# Patient Record
Sex: FEMALE | Race: WHITE | NOT HISPANIC OR LATINO | Employment: FULL TIME | URBAN - METROPOLITAN AREA
[De-identification: names, ages, dates, MRNs, and addresses within clinical notes are randomized per-mention and may not be internally consistent; named-entity substitution may affect disease eponyms.]

---

## 2024-11-18 ENCOUNTER — EVALUATION (OUTPATIENT)
Dept: PHYSICAL THERAPY | Facility: CLINIC | Age: 54
End: 2024-11-18
Payer: COMMERCIAL

## 2024-11-18 DIAGNOSIS — M17.11 UNILATERAL PRIMARY OSTEOARTHRITIS, RIGHT KNEE: Primary | ICD-10-CM

## 2024-11-18 PROCEDURE — 97110 THERAPEUTIC EXERCISES: CPT | Performed by: PHYSICAL THERAPIST

## 2024-11-18 PROCEDURE — 97161 PT EVAL LOW COMPLEX 20 MIN: CPT | Performed by: PHYSICAL THERAPIST

## 2024-11-18 NOTE — PROGRESS NOTES
PT Evaluation     Today's date: 2024  Patient name: Rayne Plasencia  : 1970  MRN: [id number]  Referring provider: Ata Stafford MD  Dx:   Encounter Diagnosis     ICD-10-CM    1. Unilateral primary osteoarthritis, right knee  M17.11           Start Time: 0800          Assessment  Impairments: abnormal or restricted ROM, impaired balance, impaired physical strength, lacks appropriate home exercise program, pain with function and poor posture     Assessment details: Rayne Plasencia is a 54 y.o. female presents with R knee OA, scheduled for R TKA on 24.  Referred to PT to learn pre-hab program. Rayne Plasencia has the above listed impairments and will benefit from skilled PT to improve deficits to return to prior level of function.  Rayne Plasencia was educated on eval findings and plan for management.  HEP initiated.  Rayne would benefit from skilled services to improve ROM, strength, flexibility, and function, and to decrease pain.    Understanding of Dx/Px/POC: good     Prognosis: good    Goals  3 visits: independent with HEP for self management    Plan  Patient would benefit from: skilled physical therapy  Planned modality interventions: cryotherapy    Planned therapy interventions: abdominal trunk stabilization, neuromuscular re-education, postural training, strengthening, stretching, therapeutic activities, therapeutic exercise, flexibility, home exercise program and patient/caregiver education    Frequency: 2x week  Duration in weeks: 2  Treatment plan discussed with: patient    Subjective Evaluation    History of Present Illness  Mechanism of injury: Pt reporting over 1 year h/o insidious onset R knee pain.  Notes coming back from work trip with prolonged walking.  Fell 2x over the past year, MRI revealed stress fx R tibia May 2024 following a fall.  Was placed in  brace x 6 wks.  Received multiple injections, no relief.  Another MRI taken, revealing MMT, also dx with OA.  Now scheduled  "for R TKA on 24,  Referred to PT for pre-hab.  Chief complaint R knee pain.  Functional limitations currently prolonged ambulation, sharp pain with sleep, R knee locks/gives out.  Patient Goals  Patient goals for therapy: decreased pain, increased strength, increased motion, independence with ADLs/IADLs, improved balance and return to sport/leisure activities  Patient goal: Learn HEP prior to R TKA 24.  Resume Peloton bike  Pain  Current pain ratin  At best pain ratin  At worst pain ratin  Location: Medial aspect R knee primarily  Quality: sharp  Aggravating factors: walking and stair climbing    Social Support  Steps to enter house: yes  2  Stairs in house: no   Lives in: one-story house  Lives with: spouse    Employment status: working (Work from home on computer)    Diagnostic Tests  X-ray: abnormal  MRI studies: abnormal  Treatments  Previous treatment: injection treatment  Current treatment: injection treatment      Objective    Gait: no AD, mildly antalgic RLE.    Observation: moderate edema R knee, no ecchymosis or erythema.  Mild tibial varum R > L.    OH Squat: weight shift to L, good balance, increased trunk flexion at hips    Knee A/PROM:  R: 1-0-130 deg  L: 3-0-142 deg    MMT: quads: R 4/5, L 5/5.  B hamstrings, glute medius 5/5.  Glute max: R 4-/5, L 4/5.  R quad set Fair    Special tests: B Lachman, Reverse Lachman, Ronald, varus and valgus stress (-).    Patellar mobility: WNL B    Flexibility: B hamstrings, gastroc WNL.  B hip flexors moderately tight.  B Jyoti's (-).         Precautions: PMH R tibia stress fx May 2024, depression, anxiety.  Pre-Hab 3 visits      Manuals                                                                 Neuro Re-Ed             QS 10\"x  15            TA cx nv                                                                             Ther Ex             Short sit flexion 10\"  x15            SLR 2x10            TKE w t band Blk 2x10      "       LAQ 4#  2x10            Nu Step for strength nv            U/L bridge R nv            Stand HF str B nv            Leg press R             Ther Activity                                                                              Modalities             Ice

## 2024-11-18 NOTE — LETTER
2024    Ata Stafford MD  [address] [facility] [address]    Patient: Rayne Plasencia   YOB: 1970   Date of Visit: 2024     Encounter Diagnosis     ICD-10-CM    1. Unilateral primary osteoarthritis, right knee  M17.11           Dear Dr. Stafford:    Thank you for your recent referral of Rayne Plasencia. Please review the attached evaluation summary from Rayne's recent visit.     Please verify that you agree with the plan of care by signing the attached order.     If you have any questions or concerns, please do not hesitate to call.     I sincerely appreciate the opportunity to share in the care of one of your patients and hope to have another opportunity to work with you in the near future.       Sincerely,    Rajiv Mary, PT      Referring Provider:      I certify that I have read the below Plan of Care and certify the need for these services furnished under this plan of treatment while under my care.                    Ata Stafford MD  [address] [facility] [address]  Via Fax: [phone]          PT Evaluation     Today's date: 2024  Patient name: Rayne Plasencia  : 1970  MRN: [id number]  Referring provider: Ata Stafford MD  Dx:   Encounter Diagnosis     ICD-10-CM    1. Unilateral primary osteoarthritis, right knee  M17.11           Start Time: 0800          Assessment  Impairments: abnormal or restricted ROM, impaired balance, impaired physical strength, lacks appropriate home exercise program, pain with function and poor posture     Assessment details: Rayne Plasencia is a 54 y.o. female presents with R knee OA, scheduled for R TKA on 24.  Referred to PT to learn pre-hab program. Rayne Plasencia has the above listed impairments and will benefit from skilled PT to improve deficits to return to prior level of function.  Rayne Plasencia was educated on eval findings and plan for management.  HEP initiated.  Rayne would benefit  from skilled services to improve ROM, strength, flexibility, and function, and to decrease pain.    Understanding of Dx/Px/POC: good     Prognosis: good    Goals  3 visits: independent with HEP for self management    Plan  Patient would benefit from: skilled physical therapy  Planned modality interventions: cryotherapy    Planned therapy interventions: abdominal trunk stabilization, neuromuscular re-education, postural training, strengthening, stretching, therapeutic activities, therapeutic exercise, flexibility, home exercise program and patient/caregiver education    Frequency: 2x week  Duration in weeks: 2  Treatment plan discussed with: patient    Subjective Evaluation    History of Present Illness  Mechanism of injury: Pt reporting over 1 year h/o insidious onset R knee pain.  Notes coming back from work trip with prolonged walking.  Fell 2x over the past year, MRI revealed stress fx R tibia May 2024 following a fall.  Was placed in  brace x 6 wks.  Received multiple injections, no relief.  Another MRI taken, revealing MMT, also dx with OA.  Now scheduled for R TKA on 24,  Referred to PT for pre-hab.  Chief complaint R knee pain.  Functional limitations currently prolonged ambulation, sharp pain with sleep, R knee locks/gives out.  Patient Goals  Patient goals for therapy: decreased pain, increased strength, increased motion, independence with ADLs/IADLs, improved balance and return to sport/leisure activities  Patient goal: Learn HEP prior to R TKA 24.  Resume Peloton bike  Pain  Current pain ratin  At best pain ratin  At worst pain ratin  Location: Medial aspect R knee primarily  Quality: sharp  Aggravating factors: walking and stair climbing    Social Support  Steps to enter house: yes  2  Stairs in house: no   Lives in: one-story house  Lives with: spouse    Employment status: working (Work from home on computer)    Diagnostic Tests  X-ray: abnormal  MRI studies:  "abnormal  Treatments  Previous treatment: injection treatment  Current treatment: injection treatment      Objective    Gait: no AD, mildly antalgic RLE.    Observation: moderate edema R knee, no ecchymosis or erythema.  Mild tibial varum R > L.    OH Squat: weight shift to L, good balance, increased trunk flexion at hips    Knee A/PROM:  R: 1-0-130 deg  L: 3-0-142 deg    MMT: quads: R 4/5, L 5/5.  B hamstrings, glute medius 5/5.  Glute max: R 4-/5, L 4/5.  R quad set Fair    Special tests: B Lachman, Reverse Lachman, Ronald, varus and valgus stress (-).    Patellar mobility: WNL B    Flexibility: B hamstrings, gastroc WNL.  B hip flexors moderately tight.  B Jyoti's (-).         Precautions: PMH R tibia stress fx May 2024, depression, anxiety.  Pre-Hab 3 visits      Manuals 11/18 /24                                                                Neuro Re-Ed             QS 10\"x  15            TA cx nv                                                                             Ther Ex             Short sit flexion 10\"  x15            SLR 2x10            TKE w t band Blk 2x10            LAQ 4#  2x10            Nu Step for strength nv            U/L bridge R nv            Stand HF str B nv            Leg press R             Ther Activity                                                                              Modalities             Ice                                                "

## 2024-11-20 ENCOUNTER — OFFICE VISIT (OUTPATIENT)
Dept: PHYSICAL THERAPY | Facility: CLINIC | Age: 54
End: 2024-11-20
Payer: COMMERCIAL

## 2024-11-20 DIAGNOSIS — M17.11 UNILATERAL PRIMARY OSTEOARTHRITIS, RIGHT KNEE: Primary | ICD-10-CM

## 2024-11-20 PROCEDURE — 97112 NEUROMUSCULAR REEDUCATION: CPT

## 2024-11-20 PROCEDURE — 97110 THERAPEUTIC EXERCISES: CPT

## 2024-11-20 NOTE — PROGRESS NOTES
"Daily Note     Today's date: 2024  Patient name: Rayne Plasencia  : 1970  MRN: [id number]  Referring provider: Ata Stafford MD  Dx:   Encounter Diagnosis     ICD-10-CM    1. Unilateral primary osteoarthritis, right knee  M17.11                      Subjective: \"It's just really stiff, more than normal, but I've been doing the exercises, I hope that means it's working.\" Pt denies R knee pain, just stiffness at arrival.       Objective: See treatment diary below      Assessment: Tolerated treatment well. Patient would benefit from continued PT For improved strength, flexibility and overall function.  Cueing for proper amount of reps/hold times with exercises.  Issued updated HEP.     Plan: Continue per plan of care.      Precautions: PMH R tibia stress fx May 2024, depression, anxiety.  Pre-Hab 3 visits      Manuals                                                                Neuro Re-Ed             QS 10\"x  15 x20           TA cx nv 10''x10                                                                            Ther Ex             Short sit flexion 10\"  x15 x20           SLR 2x10 3x10           TKE w t band Blk 2x10 3x10           LAQ 4#  2x10 3x10            Nu Step for strength nv 5' L1           U/L bridge R nv 10''x10           Stand HF str B nv 20''x5            Leg press R             Ther Activity                                                                              Modalities             Ice                               "

## 2024-11-25 ENCOUNTER — OFFICE VISIT (OUTPATIENT)
Dept: PHYSICAL THERAPY | Facility: CLINIC | Age: 54
End: 2024-11-25
Payer: COMMERCIAL

## 2024-11-25 DIAGNOSIS — M17.11 UNILATERAL PRIMARY OSTEOARTHRITIS, RIGHT KNEE: Primary | ICD-10-CM

## 2024-11-25 PROCEDURE — 97112 NEUROMUSCULAR REEDUCATION: CPT

## 2024-11-25 PROCEDURE — 97110 THERAPEUTIC EXERCISES: CPT

## 2024-11-25 NOTE — PROGRESS NOTES
"Daily Note     Today's date: 2024  Patient name: Rayne Plasencia  : 1970  MRN: [id number]  Referring provider: Ata Stafford MD  Dx:   Encounter Diagnosis     ICD-10-CM    1. Unilateral primary osteoarthritis, right knee  M17.11                      Subjective: \"Better than how it felt from the workout you gave me last time.\" Pt reports DOMS R knee following last visit, denies R knee pain 0/10 at arrival.  Pt states she was supposed to get a home cycle bike post-op but her insurance won't pay for it.    Objective: See treatment diary below  Unable to attain appropriate    Assessment: Tolerated treatment well. Patient would benefit from continued PT For improved strength, flexibility and overall function.  Added wall slides F to assist with greater ROM both pre and post op.  Issued updated HEP and encouraged icing to address DOMS.   Today is patient's last pre-op visit, pt to D/C to HEP with surgery scheduled for 24.     Plan: D/C to HEP. Surgery scheduled 24.      Precautions: PMH R tibia stress fx May 2024, depression, anxiety.  Pre-Hab 3 visits      Manuals                                                               Neuro Re-Ed             QS 10\"x  15 x20 x30          TA cx nv 10''x10 HEP                                                                           Ther Ex             Short sit flexion 10\"  x15 x20 x30          SLR 2x10 3x10 3x10          TKE w t band Blk 2x10 3x10 3x10          LAQ 4#  2x10 3x10  3x10          Nu Step for strength nv 5' L1 7'          U/L bridge R nv 10''x10 2x10          Stand HF str B nv 20''x5  No stretch, R knee p!          Leg press R seat @ 5   3pl 2x10          Wall slide F   10''x10          Ther Activity                                                                              Modalities             Ice                                 "

## 2024-12-20 ENCOUNTER — EVALUATION (OUTPATIENT)
Dept: PHYSICAL THERAPY | Facility: CLINIC | Age: 54
End: 2024-12-20
Payer: COMMERCIAL

## 2024-12-20 DIAGNOSIS — M17.11 UNILATERAL PRIMARY OSTEOARTHRITIS, RIGHT KNEE: Primary | ICD-10-CM

## 2024-12-20 DIAGNOSIS — Z96.651 S/P TKR (TOTAL KNEE REPLACEMENT), RIGHT: ICD-10-CM

## 2024-12-20 PROCEDURE — 97110 THERAPEUTIC EXERCISES: CPT | Performed by: PHYSICAL THERAPIST

## 2024-12-20 PROCEDURE — 97161 PT EVAL LOW COMPLEX 20 MIN: CPT | Performed by: PHYSICAL THERAPIST

## 2024-12-20 NOTE — LETTER
2024    Bambi Mclain PA-C  [address] [facility] [address]    Patient: Rayne Plasencia   YOB: 1970   Date of Visit: 2024     Encounter Diagnosis     ICD-10-CM    1. Unilateral primary osteoarthritis, right knee  M17.11       2. S/P TKR (total knee replacement), right  Z96.651           Dear Dr. Mclain:    Thank you for your recent referral of Rayne Plasencia. Please review the attached evaluation summary from Rayne's recent visit.     Please verify that you agree with the plan of care by signing the attached order.     If you have any questions or concerns, please do not hesitate to call.     I sincerely appreciate the opportunity to share in the care of one of your patients and hope to have another opportunity to work with you in the near future.       Sincerely,    Rajiv Mary, PT      Referring Provider:      I certify that I have read the below Plan of Care and certify the need for these services furnished under this plan of treatment while under my care.                    Bambi Mclain PA-C  [address] [facility] [address]  Via Fax: [phone]          PT Evaluation     Today's date: 2024  Patient name: Rayne Plasencia  : 1970  MRN: [id number]  Referring provider: Bambi Mclain PA-C  Dx:   Encounter Diagnosis     ICD-10-CM    1. Unilateral primary osteoarthritis, right knee  M17.11       2. S/P TKR (total knee replacement), right  Z96.651                      Assessment  Impairments: abnormal muscle firing, abnormal or restricted ROM, activity intolerance, impaired balance, impaired physical strength, lacks appropriate home exercise program, pain with function, weight-bearing intolerance and unable to perform ADL  Symptom irritability: high    Assessment details: Rayne Plasencia is a 54 y.o. female s/p R TKA on 24. Rayne Plasencia has the above listed impairments and will benefit from skilled PT to improve deficits to return to prior  level of function.  Rayne Plasencia was educated on eval findings and plan for management, safe ice, ankle pumps, need to flex R knee during sit/stand transfers.  HEP initiated.  Rayne would benefit from skilled services to improve ROM, strength, flexibility, and function, and to decrease pain.    Understanding of Dx/Px/POC: good     Prognosis: good    Goals  Impairment Goals  - Pt I with initial HEP in 1-2 visits  - R quad set Good in 2 wks  - Improve ROM equal to WFL in 4-6 weeks  - Increase strength to 5/5 in all affected areas in 4-6 weeks    Functional Goals  - Increase Functional Status Measure to: 52 in 4-6  weeks (score 11 at eval)  - Patient will be independent with comprehensive HEP in 4-6 weeks  - Ambulation is improved to prior level of function in 4-6 weeks  - Stair climbing is improved to prior level of function in 4-6 weeks  - RLE dynamic strength and balance at least 90% of LLE via single leg step test, in 4-6 wks.      Plan  Patient would benefit from: skilled physical therapy  Planned modality interventions: cryotherapy and electrical stimulation/Russian stimulation    Planned therapy interventions: joint mobilization, manual therapy, balance, flexibility, neuromuscular re-education, patient/caregiver education, strengthening, stretching, home exercise program, therapeutic activities and therapeutic exercise    Frequency: 3x week  Duration in weeks: 4  Treatment plan discussed with: patient      Subjective Evaluation    History of Present Illness  Date of surgery: 12/17/2024  Mechanism of injury: surgery  Mechanism of injury: Pt reporting over 1 year h/o insidious onset R knee pain.  Notes coming back from work trip with prolonged walking.  Fell 2x over the past year, MRI revealed stress fx R tibia May 2024 following a fall.  Was placed in  brace x 6 wks.  Received multiple injections, no relief.  Another MRI taken, revealing MMT, also dx with OA.  Received 3 pre-op PT visits here.  Now s/p  "R TKA on 24.  Chief complaint R knee stiffness, \"sharp pains\".  Functional limitations: ambulation with wheeled walker, not driving.  Notes pain has improved since pt first returned home from hospital.  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    Transfers sit to/from stand with pt keeping R knee extended, (I).  Sit to/from supine with LLE assist for RLE, (I).    Observation: B knee high TEDS.  R TKA incision glued with steri-strips, no drainage or sign of infection.  R knee with mild erythema, moderate edema, no ecchymosis noted.      Knee A/PROM:  R: AROM only due to pain: 0-2-40 deg  L: 3-0-132 deg    MMT, Special tests: deferred due to pt post-op.  Quad set R Poor.    Patellar mobility: R with decreased and painful superior/inferior glide, L WNL    Palpation: R calf mildly TTP, \"sore\", L calf non-TTP.  B lower legs intact to light touch sensation.    Flexibility: B calves moderately tight.  Hamstrings: R moderately tight, L WNL.         Precautions: PMH R tibia stress fx May 2024, depression, anxiety.  TRIM SUTURES 24.      Manuals             Patellar glides sup/inf                                                    Neuro Re-Ed           " "  QS 10\"  x15            Ankle pumps HEP                                                                             Ther Ex             Heel slide w strap 10\"  x15            Short sit flexion nv            Wall slide flexion nv            Strap gastroc str 20\"  x5            SLR nv            SAQ             LAQ             Straddle HS str             Bike for ROM             TKE w t band             Leg Press R             Ther Activity             T ball squat             FSU             Gait Training                                       Modalities             Ice              Russian stim for QS PRN                                "

## 2024-12-20 NOTE — PROGRESS NOTES
PT Evaluation     Today's date: 2024  Patient name: Rayne Plasencia  : 1970  MRN: [id number]  Referring provider: Bambi Mclain PA-C  Dx:   Encounter Diagnosis     ICD-10-CM    1. Unilateral primary osteoarthritis, right knee  M17.11       2. S/P TKR (total knee replacement), right  Z96.651                      Assessment  Impairments: abnormal muscle firing, abnormal or restricted ROM, activity intolerance, impaired balance, impaired physical strength, lacks appropriate home exercise program, pain with function, weight-bearing intolerance and unable to perform ADL  Symptom irritability: high    Assessment details: Rayne Plasencia is a 54 y.o. female s/p R TKA on 24. Rayne Plasencia has the above listed impairments and will benefit from skilled PT to improve deficits to return to prior level of function.  Rayne Plasencia was educated on eval findings and plan for management, safe ice, ankle pumps, need to flex R knee during sit/stand transfers.  HEP initiated.  Rayne would benefit from skilled services to improve ROM, strength, flexibility, and function, and to decrease pain.    Understanding of Dx/Px/POC: good     Prognosis: good    Goals  Impairment Goals  - Pt I with initial HEP in 1-2 visits  - R quad set Good in 2 wks  - Improve ROM equal to WFL in 4-6 weeks  - Increase strength to 5/5 in all affected areas in 4-6 weeks    Functional Goals  - Increase Functional Status Measure to: 52 in 4-6  weeks (score 11 at eval)  - Patient will be independent with comprehensive HEP in 4-6 weeks  - Ambulation is improved to prior level of function in 4-6 weeks  - Stair climbing is improved to prior level of function in 4-6 weeks  - RLE dynamic strength and balance at least 90% of LLE via single leg step test, in 4-6 wks.      Plan  Patient would benefit from: skilled physical therapy  Planned modality interventions: cryotherapy and electrical stimulation/Russian stimulation    Planned therapy interventions:  "joint mobilization, manual therapy, balance, flexibility, neuromuscular re-education, patient/caregiver education, strengthening, stretching, home exercise program, therapeutic activities and therapeutic exercise    Frequency: 3x week  Duration in weeks: 4  Treatment plan discussed with: patient      Subjective Evaluation    History of Present Illness  Date of surgery: 2024  Mechanism of injury: surgery  Mechanism of injury: Pt reporting over 1 year h/o insidious onset R knee pain.  Notes coming back from work trip with prolonged walking.  Fell 2x over the past year, MRI revealed stress fx R tibia May 2024 following a fall.  Was placed in  brace x 6 wks.  Received multiple injections, no relief.  Another MRI taken, revealing MMT, also dx with OA.  Received 3 pre-op PT visits here.  Now s/p R TKA on 24.  Chief complaint R knee stiffness, \"sharp pains\".  Functional limitations: ambulation with wheeled walker, not driving.  Notes pain has improved since pt first returned home from hospital.  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    Transfers sit to/from stand with pt keeping R knee extended, (I).  Sit to/from supine with LLE assist for RLE, " "(I).    Observation: B knee high TEDS.  R TKA incision glued with steri-strips, no drainage or sign of infection.  R knee with mild erythema, moderate edema, no ecchymosis noted.      Knee A/PROM:  R: AROM only due to pain: 0-2-40 deg  L: 3-0-132 deg    MMT, Special tests: deferred due to pt post-op.  Quad set R Poor.    Patellar mobility: R with decreased and painful superior/inferior glide, L WNL    Palpation: R calf mildly TTP, \"sore\", L calf non-TTP.  B lower legs intact to light touch sensation.    Flexibility: B calves moderately tight.  Hamstrings: R moderately tight, L WNL.         Precautions: PMH R tibia stress fx May 2024, depression, anxiety.  TRIM SUTURES 12/27/24.      Manuals 12/20 /24            Patellar glides sup/inf                                                    Neuro Re-Ed             QS 10\"  x15            Ankle pumps HEP                                                                             Ther Ex             Heel slide w strap 10\"  x15            Short sit flexion nv            Wall slide flexion nv            Strap gastroc str 20\"  x5            SLR nv            SAQ             LAQ             Straddle HS str             Bike for ROM             TKE w t band             Leg Press R             Ther Activity             T ball squat             FSU             Gait Training                                       Modalities             Ice              Russian stim for QS PRN                  "

## 2024-12-23 ENCOUNTER — OFFICE VISIT (OUTPATIENT)
Dept: PHYSICAL THERAPY | Facility: CLINIC | Age: 54
End: 2024-12-23
Payer: COMMERCIAL

## 2024-12-23 DIAGNOSIS — M17.11 UNILATERAL PRIMARY OSTEOARTHRITIS, RIGHT KNEE: Primary | ICD-10-CM

## 2024-12-23 DIAGNOSIS — Z96.651 S/P TKR (TOTAL KNEE REPLACEMENT), RIGHT: ICD-10-CM

## 2024-12-23 PROCEDURE — 97110 THERAPEUTIC EXERCISES: CPT | Performed by: PHYSICAL THERAPIST

## 2024-12-23 PROCEDURE — 97112 NEUROMUSCULAR REEDUCATION: CPT | Performed by: PHYSICAL THERAPIST

## 2024-12-23 NOTE — PROGRESS NOTES
"Daily Note     Today's date: 2024  Patient name: Rayne Plasencia  : 1970  MRN: [id number]  Referring provider: Bambi Mclain PA-C  Dx:   Encounter Diagnosis     ICD-10-CM    1. Unilateral primary osteoarthritis, right knee  M17.11       2. S/P TKR (total knee replacement), right  Z96.651           Start Time: 1451          Subjective: R knee pain 3/10, notes pain improving.  Has not taken pain meds since this morning.  Pain improves with doing heel slides.      Objective: See treatment diary below.  PT assist required for SLR.      Assessment: Tolerated treatment fair. Patient would benefit from continued PT.  HEP progressed.      Plan: Continue per plan of care.      Precautions: PMH R tibia stress fx May 2024, depression, anxiety.  TRIM SUTURES 24.      Manuals            Patellar glides sup/inf  G3                                                  Neuro Re-Ed             QS 10\"  x15 x30           Ankle pumps HEP                                                                             Ther Ex             Heel slide w strap 10\"  x15            Short sit flexion nv 10\"x  15           Wall slide flexion nv 10\"  x15           Strap gastroc str 20\"  x5 x5           SLR nv 2x10 (A)           SAQ             LAQ             Straddle HS str             Bike for ROM  Part rev 10'           TKE w t band             Leg Press R             Ther Activity             T ball squat             FSU             Gait Training                                       Modalities             Ice              Russian stim for QS PRN                  "

## 2024-12-24 ENCOUNTER — OFFICE VISIT (OUTPATIENT)
Dept: PHYSICAL THERAPY | Facility: CLINIC | Age: 54
End: 2024-12-24
Payer: COMMERCIAL

## 2024-12-24 DIAGNOSIS — M17.11 UNILATERAL PRIMARY OSTEOARTHRITIS, RIGHT KNEE: Primary | ICD-10-CM

## 2024-12-24 DIAGNOSIS — Z96.651 S/P TKR (TOTAL KNEE REPLACEMENT), RIGHT: ICD-10-CM

## 2024-12-24 PROCEDURE — 97112 NEUROMUSCULAR REEDUCATION: CPT | Performed by: PHYSICAL THERAPIST

## 2024-12-24 PROCEDURE — 97110 THERAPEUTIC EXERCISES: CPT | Performed by: PHYSICAL THERAPIST

## 2024-12-24 NOTE — PROGRESS NOTES
"Daily Note     Today's date: 2024  Patient name: Rayne Plasencia  : 1970  MRN: [id number]  Referring provider: Bambi Mclain PA-C  Dx:   Encounter Diagnosis     ICD-10-CM    1. Unilateral primary osteoarthritis, right knee  M17.11       2. S/P TKR (total knee replacement), right  Z96.651                      Subjective: R knee pain 1-2/10, \"more of a nuisance pain.\"      Objective: See treatment diary below      Assessment: Tolerated treatment well. Patient exhibited good technique with therapeutic exercises and would benefit from continued PT      Plan: Continue per plan of care.      Precautions: PMH R tibia stress fx May 2024, depression, anxiety.  TRIM SUTURES 24.      Manuals           Patellar glides sup/inf  G3 G3                                                 Neuro Re-Ed             QS 10\"  x15 x30 x30          Ankle pumps HEP                                                                             Ther Ex             Heel slide w strap 10\"  x15            Short sit flexion nv 10\"x  15           Wall slide flexion nv 10\"  x15 x30          Strap gastroc str 20\"  x5 x5 x5          SLR nv 2x10 (A) 3x10 (A)          SAQ   2x10 (A)          LAQ             Straddle HS str             Bike for ROM  Part rev 10' 10'          TKE w t band   Blk 3x10          Leg Press R             Ther Activity             T ball squat   Mini 3x10          FSU             Gait Training                                       Modalities             Ice              Russian stim for QS PRN                    "

## 2024-12-26 ENCOUNTER — OFFICE VISIT (OUTPATIENT)
Dept: PHYSICAL THERAPY | Facility: CLINIC | Age: 54
End: 2024-12-26
Payer: COMMERCIAL

## 2024-12-26 DIAGNOSIS — Z96.651 S/P TKR (TOTAL KNEE REPLACEMENT), RIGHT: ICD-10-CM

## 2024-12-26 DIAGNOSIS — M17.11 UNILATERAL PRIMARY OSTEOARTHRITIS, RIGHT KNEE: Primary | ICD-10-CM

## 2024-12-26 PROCEDURE — 97112 NEUROMUSCULAR REEDUCATION: CPT | Performed by: PHYSICAL THERAPIST

## 2024-12-26 PROCEDURE — 97110 THERAPEUTIC EXERCISES: CPT | Performed by: PHYSICAL THERAPIST

## 2024-12-26 NOTE — PROGRESS NOTES
"Daily Note     Today's date: 2024  Patient name: Rayne Plasencia  : 1970  MRN: [id number]  Referring provider: Bambi Mclain PA-C  Dx:   Encounter Diagnosis     ICD-10-CM    1. Unilateral primary osteoarthritis, right knee  M17.11       2. S/P TKR (total knee replacement), right  Z96.651                      Subjective: R knee pain 1/10.  Doing HEP QD including flexion ROM.      Objective: See treatment diary below.  Aggressive HEP stressed to pt including ROM BID.  Flexion PROM 70 deg.      Assessment: Tolerated treatment fair. Patient would benefit from continued PT. Slow progress.  Aggressive HEP stressed, for flexion ROM in particular.  Unable to do independent SLR.      Plan: Continue per plan of care.      Precautions: PMH R tibia stress fx May 2024, depression, anxiety.  TRIM SUTURES 24.      Manuals          Patellar glides sup/inf  G3 G3 G3                                                Neuro Re-Ed             QS 10\"  x15 x30 x30 x30         Ankle pumps HEP                                                                             Ther Ex             Heel slide w strap 10\"  x15            Short sit flexion nv 10\"x  15           Wall slide flexion nv 10\"  x15 x30 x30         Strap gastroc str 20\"  x5 x5 x5 x5         SLR nv 2x10 (A) 3x10 (A) 3x10 (A)         SAQ   2x10 (A) 3x10 (I)         LAQ    2x10 (I)         Straddle HS str             Bike for ROM  Part rev 10' 10' 10'         TKE w t band   Blk 3x10 3x10         Leg Press R seat 6 holes    2 pl 3x10         Ther Activity             T ball squat   Mini 3x10 3x10 wall          FSU             Gait Training                                       Modalities             Ice              Russian stim for QS PRN                      "

## 2024-12-30 ENCOUNTER — OFFICE VISIT (OUTPATIENT)
Dept: PHYSICAL THERAPY | Facility: CLINIC | Age: 54
End: 2024-12-30
Payer: COMMERCIAL

## 2024-12-30 DIAGNOSIS — M17.11 UNILATERAL PRIMARY OSTEOARTHRITIS, RIGHT KNEE: Primary | ICD-10-CM

## 2024-12-30 DIAGNOSIS — Z96.651 S/P TKR (TOTAL KNEE REPLACEMENT), RIGHT: ICD-10-CM

## 2024-12-30 PROCEDURE — 97112 NEUROMUSCULAR REEDUCATION: CPT | Performed by: PHYSICAL THERAPIST

## 2024-12-30 PROCEDURE — 97110 THERAPEUTIC EXERCISES: CPT | Performed by: PHYSICAL THERAPIST

## 2024-12-30 NOTE — PROGRESS NOTES
"Daily Note     Today's date: 2024  Patient name: Rayne Plasencia  : 1970  MRN: [id number]  Referring provider: Bambi Mclain PA-C  Dx:   Encounter Diagnosis     ICD-10-CM    1. Unilateral primary osteoarthritis, right knee  M17.11       2. S/P TKR (total knee replacement), right  Z96.651                      Subjective: R knee pain 0-1/10, reports aggressive HEP over the weekend, \"I kept saying 'NO MANIPULATION!'\".  Pt reports that last week her  helped her with trunk transfer, holding her around the waist, \"we both felt it, like a pop\" R lower rib cage anteriorly, \"so I've been dealing with that.\"      Objective: See treatment diary below. Urged pt to heat/ice R lower flank PRN for pain.  To get checked out by physician PRN if pain does not improve.  Pt to PT with wheeled walker for ambulation, attempting with no AD.  PT showed pt gait with SPC L hand, 3 point reciprocal pattern.  Pt to obtain SPC.  Flexion PROM with wall slide and seated flexion 90 deg (70 deg last visit ).    PT intended to trim light, transparent proximal and distal sutures per PT referral.  Sutures not visible, likely already broken off at skin level.      Assessment: Tolerated treatment well. Patient would benefit from continued PT.  ROM slowly improving.  Still unable to do independent SLR.  Functional mobility improving, pt weaning to SPC in PT.      Plan: Continue per plan of care.      Precautions: PMH R tibia stress fx May 2024, depression, anxiety.        Manuals         Patellar glides sup/inf  G3 G3 G3 G3                                               Neuro Re-Ed             QS 10\"  x15 x30 x30 x30 x30        Ankle pumps HEP                                                                             Ther Ex             Heel slide w strap 10\"  x15            Short sit flexion nv 10\"x  15   x30        Wall slide flexion nv 10\"  x15 x30 x30 x30        Strap gastroc str 20\"  x5 x5 " x5 x5 x5        SLR nv 2x10 (A) 3x10 (A) 3x10 (A) 3x10  (A)        SAQ   2x10 (A) 3x10 (I) 3x10 (I)        LAQ    2x10 (I) 3x10 (I)        Straddle HS str             Bike for ROM  Part rev 10' 10' 10' 10'        TKE w t band   Blk 3x10 3x10 3x10        Leg Press R seat 6 holes    2 pl 3x10 3x10        Ther Activity             T ball squat   Mini 3x10 3x10 wall  3x10        FSU             Gait Training     SPC L                                  Modalities             Ice              Russian stim for QS PRN                         Composite Graft Text: The defect edges were debeveled with a #15 scalpel blade.  Given the location of the defect, shape of the defect, the proximity to free margins and the fact the defect was full thickness a composite graft was deemed most appropriate.  The defect was outline and then transferred to the donor site.  A full thickness graft was then excised from the donor site. The graft was then placed in the primary defect, oriented appropriately and then sutured into place.  The secondary defect was then repaired using a primary closure.

## 2024-12-31 ENCOUNTER — OFFICE VISIT (OUTPATIENT)
Dept: PHYSICAL THERAPY | Facility: CLINIC | Age: 54
End: 2024-12-31
Payer: COMMERCIAL

## 2024-12-31 DIAGNOSIS — M17.11 UNILATERAL PRIMARY OSTEOARTHRITIS, RIGHT KNEE: Primary | ICD-10-CM

## 2024-12-31 DIAGNOSIS — Z96.651 S/P TKR (TOTAL KNEE REPLACEMENT), RIGHT: ICD-10-CM

## 2024-12-31 PROCEDURE — 97110 THERAPEUTIC EXERCISES: CPT | Performed by: PHYSICAL THERAPIST

## 2024-12-31 PROCEDURE — 97530 THERAPEUTIC ACTIVITIES: CPT | Performed by: PHYSICAL THERAPIST

## 2024-12-31 NOTE — PROGRESS NOTES
"Daily Note     Today's date: 2024  Patient name: Rayne Plasencia  : 1970  MRN: [id number]  Referring provider: Bambi Mclain PA-C  Dx:   Encounter Diagnosis     ICD-10-CM    1. Unilateral primary osteoarthritis, right knee  M17.11       2. S/P TKR (total knee replacement), right  Z96.651           Start Time: 0945          Subjective: R knee pain 1-2/10, \"sore from working it with the exercises.\"      Objective: See treatment diary below.  Able to do (I) SLR by end of bout.      Assessment: Tolerated treatment well. Patient exhibited good technique with therapeutic exercises and would benefit from continued PT.  Slowly making progress.      Plan: Continue per plan of care.      Precautions: PMH R tibia stress fx May 2024, depression, anxiety.        Manuals        Patellar glides sup/inf  G3 G3 G3 G3                                               Neuro Re-Ed             QS 10\"  x15 x30 x30 x30 x30 X30 ankle roll       Ankle pumps HEP                                                                             Ther Ex             Heel slide w strap 10\"  x15            Short sit flexion nv 10\"x  15   x30 x30       Wall slide flexion nv 10\"  x15 x30 x30 x30 x30       Strap gastroc str 20\"  x5 x5 x5 x5 x5 x5       SLR nv 2x10 (A) 3x10 (A) 3x10 (A) 3x10  (A) 3x10 (A) to (I)       SAQ   2x10 (A) 3x10 (I) 3x10 (I) 3x10       LAQ    2x10 (I) 3x10 (I) 3x10       Straddle HS str             Bike for ROM  Part rev 10' 10' 10' 10' 10'       TKE w t band   Blk 3x10 3x10 3x10 3x10 silver       Leg Press R seat 6 holes    2 pl 3x10 3x10 3x10       Ther Activity             T ball squat   Mini 3x10 3x10 wall  3x10 3x10       FSU      4\" x10       Gait Training     SPC L                                  Modalities             Ice              Russian stim for QS PRN                          "

## 2025-01-02 ENCOUNTER — OFFICE VISIT (OUTPATIENT)
Dept: PHYSICAL THERAPY | Facility: CLINIC | Age: 55
End: 2025-01-02
Payer: COMMERCIAL

## 2025-01-02 DIAGNOSIS — Z96.651 S/P TKR (TOTAL KNEE REPLACEMENT), RIGHT: ICD-10-CM

## 2025-01-02 DIAGNOSIS — M17.11 UNILATERAL PRIMARY OSTEOARTHRITIS, RIGHT KNEE: Primary | ICD-10-CM

## 2025-01-02 PROCEDURE — 97110 THERAPEUTIC EXERCISES: CPT | Performed by: PHYSICAL THERAPIST

## 2025-01-02 PROCEDURE — 97530 THERAPEUTIC ACTIVITIES: CPT | Performed by: PHYSICAL THERAPIST

## 2025-01-02 NOTE — PROGRESS NOTES
"Daily Note     Today's date: 2025  Patient name: Rayne Plasenica  : 1970  MRN: [id number]  Referring provider: Ata Stafford MD  Dx:   Encounter Diagnosis     ICD-10-CM    1. Unilateral primary osteoarthritis, right knee  M17.11       2. S/P TKR (total knee replacement), right  Z96.651                      Subjective: R knee pain 3/10 \"because I worked it this morning (HEP).\"  Pt reports being able to do independent SLR at home today.  RE: not getting full revolution on bike \"it's just fear, I'm afraid.  I know I can do it.\"      Objective: See treatment diary below. Pt able to make 1 full revolution on bike at end of 10 min.  Flexion PROM 96 deg.      Assessment: Tolerated treatment fair. Patient would benefit from continued PT.  Pt extremely apprehensive, with verbal cuing and encouragement with ROM exercise, to make RLE perform independently with transfers and not use other extremities for assist.      Plan: Continue per plan of care.      Precautions: PMH R tibia stress fx May 2024, depression, anxiety.        Manuals       Patellar glides sup/inf  G3 G3 G3 G3  G3                                             Neuro Re-Ed             QS 10\"  x15 x30 x30 x30 x30 X30 ankle roll x30      Ankle pumps HEP                                                                             Ther Ex             Heel slide w strap 10\"  x15            Short sit flexion nv 10\"x  15   x30 x30 x30      Wall slide flexion nv 10\"  x15 x30 x30 x30 x30 x30      Strap gastroc str 20\"  x5 x5 x5 x5 x5 x5 x5      SLR nv 2x10 (A) 3x10 (A) 3x10 (A) 3x10  (A) 3x10 (A) to (I) 3x10 (I)      SAQ   2x10 (A) 3x10 (I) 3x10 (I) 3x10 3x10      LAQ    2x10 (I) 3x10 (I) 3x10 3x10      Straddle HS str             Bike for ROM  Part rev 10' 10' 10' 10' 10' 10'      TKE w t band   Blk 3x10 3x10 3x10 3x10 silver 3x10      Leg Press R seat 6 holes    2 pl 3x10 3x10 3x10 3x10 3pl      Ther Activity           " "  T ball squat   Mini 3x10 3x10 wall  3x10 3x10 3x10      FSU      4\" x10 3x10      Gait Training     SPC L                                  Modalities             Ice              Russian stim for QS PRN                            "

## 2025-01-06 ENCOUNTER — APPOINTMENT (OUTPATIENT)
Dept: PHYSICAL THERAPY | Facility: CLINIC | Age: 55
End: 2025-01-06
Payer: COMMERCIAL

## 2025-01-08 ENCOUNTER — OFFICE VISIT (OUTPATIENT)
Dept: PHYSICAL THERAPY | Facility: CLINIC | Age: 55
End: 2025-01-08
Payer: COMMERCIAL

## 2025-01-08 DIAGNOSIS — Z96.651 S/P TKR (TOTAL KNEE REPLACEMENT), RIGHT: ICD-10-CM

## 2025-01-08 DIAGNOSIS — M17.11 UNILATERAL PRIMARY OSTEOARTHRITIS, RIGHT KNEE: Primary | ICD-10-CM

## 2025-01-08 PROCEDURE — 97530 THERAPEUTIC ACTIVITIES: CPT | Performed by: PHYSICAL THERAPIST

## 2025-01-08 PROCEDURE — 97110 THERAPEUTIC EXERCISES: CPT | Performed by: PHYSICAL THERAPIST

## 2025-01-08 NOTE — PROGRESS NOTES
"Daily Note     Today's date: 2025  Patient name: Rayne Plasencia  : 1970  MRN: [id number]  Referring provider: Ata Stafford MD  Dx:   Encounter Diagnosis     ICD-10-CM    1. Unilateral primary osteoarthritis, right knee  M17.11       2. S/P TKR (total knee replacement), right  Z96.651                      Subjective: \"Just stiff. (R knee).  I rode the bike before I came here.\"  Ortho f/u tomorrow.        Objective: See treatment diary below. Able to make full revolution on bike fwd and reverse.  Flexion PROM 105 deg. Verbal cues for gait, pt circumducting RLE in swing phase with SPC L hand.      Assessment: Tolerated treatment fair. Patient exhibited good technique with therapeutic exercises and would benefit from continued PT. Pt making slow progress.      Plan: Continue per plan of care.  Ortho f/u 25.     Precautions: PMH R tibia stress fx May 2024, depression, anxiety.        Manuals      Patellar glides sup/inf  G3 G3 G3 G3  G3 G3                                            Neuro Re-Ed             QS 10\"  x15 x30 x30 x30 x30 X30 ankle roll x30 x30     Ankle pumps HEP                                                                             Ther Ex             Heel slide w strap 10\"  x15            Short sit flexion nv 10\"x  15   x30 x30 x30 x30     Wall slide flexion nv 10\"  x15 x30 x30 x30 x30 x30 x30     Strap gastroc str 20\"  x5 x5 x5 x5 x5 x5 x5 Wall x5     SLR nv 2x10 (A) 3x10 (A) 3x10 (A) 3x10  (A) 3x10 (A) to (I) 3x10 (I) 3x10     SAQ   2x10 (A) 3x10 (I) 3x10 (I) 3x10 3x10 1# 3x10     LAQ    2x10 (I) 3x10 (I) 3x10 3x10 1# 3x10     Straddle HS str             Bike for ROM  Part rev 10' 10' 10' 10' 10' 10' 10' part to full     TKE w t band   Blk 3x10 3x10 3x10 3x10 silver 3x10 3x10     Leg Press R seat 6 holes    2 pl 3x10 3x10 3x10 3x10 3pl 3x10     Ther Activity             T ball squat   Mini 3x10 3x10 wall  3x10 3x10 3x10 3x10     FSU      " "4\" x10 3x10 3x10     Gait Training     SPC L                                  Modalities             Ice              Russian stim for QS PRN                              "

## 2025-01-09 ENCOUNTER — OFFICE VISIT (OUTPATIENT)
Dept: PHYSICAL THERAPY | Facility: CLINIC | Age: 55
End: 2025-01-09
Payer: COMMERCIAL

## 2025-01-09 DIAGNOSIS — M17.11 UNILATERAL PRIMARY OSTEOARTHRITIS, RIGHT KNEE: Primary | ICD-10-CM

## 2025-01-09 DIAGNOSIS — Z96.651 S/P TKR (TOTAL KNEE REPLACEMENT), RIGHT: ICD-10-CM

## 2025-01-09 PROCEDURE — 97110 THERAPEUTIC EXERCISES: CPT | Performed by: PHYSICAL THERAPIST

## 2025-01-09 PROCEDURE — 97530 THERAPEUTIC ACTIVITIES: CPT | Performed by: PHYSICAL THERAPIST

## 2025-01-09 NOTE — PROGRESS NOTES
"Daily Note     Today's date: 2025  Patient name: Rayne Plasencia  : 1970  MRN: [id number]  Referring provider: Ata Stafford MD  Dx:   Encounter Diagnosis     ICD-10-CM    1. Unilateral primary osteoarthritis, right knee  M17.11       2. S/P TKR (total knee replacement), right  Z96.651                      Subjective: R knee pain 3-4/10, had ortho f/u with KIM, pleased with pt progress.  \"She (PA) said the goal today was to get it straight and bend it to 90 deg and I did that. I told her I got to 105 deg of bend last time.\"      Objective: See treatment diary below      Assessment: Tolerated treatment well. Patient would benefit from continued PT      Plan: Continue per plan of care.      Precautions: PMH R tibia stress fx May 2024, depression, anxiety.        Manuals     Patellar glides sup/inf  G3 G3 G3 G3  G3 G3                                            Neuro Re-Ed             QS 10\"  x15 x30 x30 x30 x30 X30 ankle roll x30 x30 x30    Ankle pumps HEP                                                                             Ther Ex             Heel slide w strap 10\"  x15            Short sit flexion nv 10\"x  15   x30 x30 x30 x30 HEP    Wall slide flexion nv 10\"  x15 x30 x30 x30 x30 x30 x30 x30    Strap gastroc str 20\"  x5 x5 x5 x5 x5 x5 x5 Wall x5 x5    SLR nv 2x10 (A) 3x10 (A) 3x10 (A) 3x10  (A) 3x10 (A) to (I) 3x10 (I) 3x10 3x10    SAQ   2x10 (A) 3x10 (I) 3x10 (I) 3x10 3x10 1# 3x10 2# 3x10    LAQ    2x10 (I) 3x10 (I) 3x10 3x10 1# 3x10 2# 3x10    Straddle HS str             Bike for ROM  Part rev 10' 10' 10' 10' 10' 10' 10' part to full 10'    Stand flexion foot on chair         10\"x 30    TKE w t band   Blk 3x10 3x10 3x10 3x10 silver 3x10 3x10 3x10    Leg Press R seat 6 holes    2 pl 3x10 3x10 3x10 3x10 3pl 3x10 3x10    Ther Activity             T ball squat   Mini 3x10 3x10 wall  3x10 3x10 3x10 3x10 3x10    FSU      4\" x10 3x10 3x10 3x10    Gait " Training     SPC L                                  Modalities             Ice              Russian stim for QS PRN

## 2025-01-13 ENCOUNTER — OFFICE VISIT (OUTPATIENT)
Dept: PHYSICAL THERAPY | Facility: CLINIC | Age: 55
End: 2025-01-13
Payer: COMMERCIAL

## 2025-01-13 DIAGNOSIS — Z96.651 S/P TKR (TOTAL KNEE REPLACEMENT), RIGHT: ICD-10-CM

## 2025-01-13 DIAGNOSIS — M17.11 UNILATERAL PRIMARY OSTEOARTHRITIS, RIGHT KNEE: Primary | ICD-10-CM

## 2025-01-13 PROCEDURE — 97110 THERAPEUTIC EXERCISES: CPT

## 2025-01-13 PROCEDURE — 97530 THERAPEUTIC ACTIVITIES: CPT

## 2025-01-13 NOTE — PROGRESS NOTES
"Daily Note     Today's date: 2025  Patient name: Rayne Plasencia  : 1970  MRN: [id number]  Referring provider: Ata Stafford MD  Dx:   Encounter Diagnosis     ICD-10-CM    1. Unilateral primary osteoarthritis, right knee  M17.11       2. S/P TKR (total knee replacement), right  Z96.651                      Subjective: \"Sometimes it feels like it has to crack.\" Pt c/o's of R knee pain 1-2/10 at arrival, amb with SPC and antalgic gait. Reports compliance with HEP.       Objective: See treatment diary below      Assessment: Tolerated treatment well. Patient would benefit from continued PT For improved strength, flexibility and overall function.  Emphasis on knee flexion/extension, toeing off and heel strike with gait. Verbal  cueing to avoid hip circumduction stepping up onto 4'' step; pt is aware of this and was able to self-correct.    Plan: Continue per plan of care.      Precautions: PMH R tibia stress fx May 2024, depression, anxiety.        Manuals    Patellar glides sup/inf  G3 G3 G3 G3  G3 G3                                            Neuro Re-Ed             QS 10\"  x15 x30 x30 x30 x30 X30 ankle roll x30 x30 x30 x30   Ankle pumps HEP                                                                             Ther Ex             Heel slide w strap 10\"  x15            Short sit flexion nv 10\"x  15   x30 x30 x30 x30 HEP    Wall slide flexion nv 10\"  x15 x30 x30 x30 x30 x30 x30 x30 X30    Strap gastroc str 20\"  x5 x5 x5 x5 x5 x5 x5 Wall x5 x5 x5   SLR nv 2x10 (A) 3x10 (A) 3x10 (A) 3x10  (A) 3x10 (A) to (I) 3x10 (I) 3x10 3x10 3x10   SAQ   2x10 (A) 3x10 (I) 3x10 (I) 3x10 3x10 1# 3x10 2# 3x10 3x10   LAQ    2x10 (I) 3x10 (I) 3x10 3x10 1# 3x10 2# 3x10 3x10    Straddle HS str             Bike for ROM  Part rev 10' 10' 10' 10' 10' 10' 10' part to full 10' 10'   Stand flexion foot on chair         10\"x 30 x30   TKE w t band   Blk 3x10 3x10 3x10 3x10 silver " "3x10 3x10 3x10 3x10   Leg Press R seat 6 holes    2 pl 3x10 3x10 3x10 3x10 3pl 3x10 3x10 3x10   Ther Activity             T ball squat   Mini 3x10 3x10 wall  3x10 3x10 3x10 3x10 3x10 3x10   FSU      4\" x10 3x10 3x10 3x10 3x10   Gait Training     SPC L                                  Modalities             Ice              Russian stim for QS PRN                                  "

## 2025-01-15 ENCOUNTER — OFFICE VISIT (OUTPATIENT)
Dept: PHYSICAL THERAPY | Facility: CLINIC | Age: 55
End: 2025-01-15
Payer: COMMERCIAL

## 2025-01-15 DIAGNOSIS — Z96.651 S/P TKR (TOTAL KNEE REPLACEMENT), RIGHT: ICD-10-CM

## 2025-01-15 DIAGNOSIS — M17.11 UNILATERAL PRIMARY OSTEOARTHRITIS, RIGHT KNEE: Primary | ICD-10-CM

## 2025-01-15 PROCEDURE — 97110 THERAPEUTIC EXERCISES: CPT

## 2025-01-15 PROCEDURE — 97530 THERAPEUTIC ACTIVITIES: CPT

## 2025-01-15 NOTE — PROGRESS NOTES
"Daily Note     Today's date: 1/15/2025  Patient name: Rayne Plasencia  : 1970  MRN: [id number]  Referring provider: Ata Stafford MD  Dx:   Encounter Diagnosis     ICD-10-CM    1. Unilateral primary osteoarthritis, right knee  M17.11       2. S/P TKR (total knee replacement), right  Z96.651                      Subjective: \"I rode a little bike this morning for a warm up.\" Pt c/o's of R knee pain 1/10 at arrival, continued use of SPC. States she feels like she's not yet ready to return to driving because she can't lift her leg yet.       Objective: See treatment diary below  Decreased heel strike and toeing off during gait  R knee flexion AAROM: 108 with pain    Assessment: Tolerated treatment fair. Patient would benefit from continued PT For improved strength, flexibility and overall function.  Verbal cueing for normal gait mechanics.   Improving R knee ROM.    Plan: Continue per plan of care.      Precautions: PMH R tibia stress fx May 2024, depression, anxiety.        Manuals 1/15         1/13   Patellar glides sup/inf Patellar PROM VIRI                                                   Neuro Re-Ed             QS Ankle roll 10''  x30          x30                                                                                 Ther Ex                                       Wall slide flexion 10''  x30         X30    Wall gastroc str 20''x5         x5   SLR X25 0#,  1x10 1# 2#         3x10   SAQ 2# 3x10         3x10   LAQ 2#  3x10         3x10    Straddle HS str 20''x5            Bike for ROM Part to full 10'         10'   Stand flexion foot on chair 10'' x30         x30   TKE w t band Silver 3x10         3x10   Leg Press R seat 6 holes 4pl 3x10         3x10   Ther Activity             T ball squat 3x10         3x10   FSU 6''  3x10         3x10   Gait Training                                       Modalities             Ice              Russian stim for QS PRN                                    "

## 2025-01-16 ENCOUNTER — OFFICE VISIT (OUTPATIENT)
Dept: PHYSICAL THERAPY | Facility: CLINIC | Age: 55
End: 2025-01-16
Payer: COMMERCIAL

## 2025-01-16 DIAGNOSIS — Z96.651 S/P TKR (TOTAL KNEE REPLACEMENT), RIGHT: ICD-10-CM

## 2025-01-16 DIAGNOSIS — M17.11 UNILATERAL PRIMARY OSTEOARTHRITIS, RIGHT KNEE: Primary | ICD-10-CM

## 2025-01-16 PROCEDURE — 97110 THERAPEUTIC EXERCISES: CPT | Performed by: PHYSICAL THERAPIST

## 2025-01-16 PROCEDURE — 97530 THERAPEUTIC ACTIVITIES: CPT | Performed by: PHYSICAL THERAPIST

## 2025-01-16 NOTE — PROGRESS NOTES
"Daily Note     Today's date: 2025  Patient name: Rayne Plasencia  : 1970  MRN: [id number]  Referring provider: Ata Stafofrd MD  Dx:   Encounter Diagnosis     ICD-10-CM    1. Unilateral primary osteoarthritis, right knee  M17.11       2. S/P TKR (total knee replacement), right  Z96.651                      Subjective: \"Sore today.\"  R knee pain 1/10.  \"It does not want to bend today\"      Objective: See treatment diary below      Assessment: Tolerated treatment well. Patient exhibited good technique with therapeutic exercises and would benefit from continued PT      Plan: Continue per plan of care.      Precautions: PMH R tibia stress fx May 2024, depression, anxiety.        Manuals 1/15 1/16        1/13   Patellar glides sup/inf Patellar PROM VIRI                                                   Neuro Re-Ed             QS Ankle roll 10''  x30  x30        x30   Clock taps F,lat  nv                                                                            Ther Ex                                       Wall slide flexion 10''  x30 x30        X30    Wall gastroc str 20''x5 x5        x5   SLR X25 0#,  1x10 1# 2#  3x10        3x10   SAQ 2# 3x10 3# 3x10        3x10   LAQ 2#  3x10 3# 3x10        3x10    Straddle HS str 20''x5 x5           Bike for ROM Part to full 10' 10' full        10'   Stand flexion foot on chair 10'' x30 x30        x30   TKE w t band Silver 3x10 3x10        3x10   Leg Press R seat 6 holes 4pl 3x10 3x10        3x10   Ther Activity             T ball squat 3x10 3x10        3x10   LSU  4\" 2x10           FSU 6''  3x10 3x10        3x10   Gait Training                                       Modalities             Ice              Russian stim for QS PRN                                      "

## 2025-01-20 ENCOUNTER — OFFICE VISIT (OUTPATIENT)
Dept: PHYSICAL THERAPY | Facility: CLINIC | Age: 55
End: 2025-01-20
Payer: COMMERCIAL

## 2025-01-20 DIAGNOSIS — M17.11 UNILATERAL PRIMARY OSTEOARTHRITIS, RIGHT KNEE: Primary | ICD-10-CM

## 2025-01-20 DIAGNOSIS — Z96.651 S/P TKR (TOTAL KNEE REPLACEMENT), RIGHT: ICD-10-CM

## 2025-01-20 PROCEDURE — 97530 THERAPEUTIC ACTIVITIES: CPT

## 2025-01-20 PROCEDURE — 97112 NEUROMUSCULAR REEDUCATION: CPT

## 2025-01-20 PROCEDURE — 97110 THERAPEUTIC EXERCISES: CPT

## 2025-01-20 NOTE — PROGRESS NOTES
"Daily Note     Today's date: 2025  Patient name: Rayne Plasencia  : 1970  MRN: [id number]  Referring provider: Ata Stafford MD  Dx:   Encounter Diagnosis     ICD-10-CM    1. Unilateral primary osteoarthritis, right knee  M17.11       2. S/P TKR (total knee replacement), right  Z96.651                      Subjective: \"Good.\" Pt c/o's of R knee pain 1/10 at arrival.       Objective: See treatment diary below      Assessment: Tolerated treatment well. Patient would benefit from continued PT For improved strength, flexibility and overall function.  Pt self aware of R hip compensation for R knee flexion ROM on recumbent bike and wall slides but able to self-correct with improved form by end of warm up/sets.     Plan: Continue per plan of care.      Precautions: PMH R tibia stress fx May 2024, depression, anxiety.        Manuals 1/15 1/16 1/20       1/13   Patellar glides sup/inf Patellar PROM VIRI                                                   Neuro Re-Ed             QS Ankle roll 10''  x30  x30 x30       x30   Clock taps F,lat  nv 2x10 F                                                                           Ther Ex                                       Wall slide flexion 10''  x30 x30 x30       X30    Wall gastroc str 20''x5 x5 X5        x5   SLR X25 0#,  1x10 1# 2#  3x10 3x10       3x10   SAQ 2# 3x10 3# 3x10 3x10       3x10   LAQ 2#  3x10 3# 3x10 3x10       3x10    Straddle HS str 20''x5 x5 x5          Bike for ROM Part to full 10' 10' full 10' full       10'   Stand flexion foot on chair 10'' x30 x30 x30       x30   TKE w t band Silver 3x10 3x10 3x10       3x10   Leg Press R seat 6 holes 4pl 3x10 3x10 3x10        3x10   Ther Activity             T ball squat 3x10 3x10 3x10       3x10   LSU  4\" 2x10 6'' 2x10          FSU 6''  3x10 3x10 3x10       3x10   Gait Training                                       Modalities             Ice              Russian stim for QS PRN           "

## 2025-01-22 ENCOUNTER — EVALUATION (OUTPATIENT)
Dept: PHYSICAL THERAPY | Facility: CLINIC | Age: 55
End: 2025-01-22
Payer: COMMERCIAL

## 2025-01-22 DIAGNOSIS — Z96.651 S/P TKR (TOTAL KNEE REPLACEMENT), RIGHT: ICD-10-CM

## 2025-01-22 DIAGNOSIS — M17.11 UNILATERAL PRIMARY OSTEOARTHRITIS, RIGHT KNEE: Primary | ICD-10-CM

## 2025-01-22 PROCEDURE — 97110 THERAPEUTIC EXERCISES: CPT | Performed by: PHYSICAL THERAPIST

## 2025-01-22 PROCEDURE — 97530 THERAPEUTIC ACTIVITIES: CPT | Performed by: PHYSICAL THERAPIST

## 2025-01-22 PROCEDURE — 97164 PT RE-EVAL EST PLAN CARE: CPT | Performed by: PHYSICAL THERAPIST

## 2025-01-22 PROCEDURE — 97112 NEUROMUSCULAR REEDUCATION: CPT | Performed by: PHYSICAL THERAPIST

## 2025-01-22 NOTE — PROGRESS NOTES
"RE-EVALUATION:    Today's date: 2025  Patient name: Rayne Plasencia  : 1970  MRN: [id number]  Referring provider: Ata Stafford MD  Dx:   Encounter Diagnosis     ICD-10-CM    1. Unilateral primary osteoarthritis, right knee  M17.11       2. S/P TKR (total knee replacement), right  Z96.651           Start Time: 1042          Subjective: R knee pain 1/10.  \"There's significant improved.\"  Notes pain with prolonged ambulation.  Does not use crutch with ambulation at home, only outside of house.  Not driving yet, \"I don't feel like I have the reflexes yet.  Next ortho f/u 25.   Has not attempted steps.  \"I think a lot of it is I'm afraid, afraid to bend it.\"      Objective: See treatment diary below.  Urged pt to practice driving in large open parking lot, abrupt stops.  Urged pt to wean from 1 crutch outside at tolerated.  Urged pt to attempt steps safely, beneficial therapeutic activity.    RE-EVALUATION:    Pain: 1-5/10 R knee (2-10/10 at eval 24)    FOTO functional score 39, projected score 52.  Score at eval 11.  Higher score = higher function.    Gait: 1 crutch L, 3 point reciprocal pattern.    R knee AROM 1-0-98 deg.  PROM: 1-0-108 deg, manual stretch for flexion by PT; (0-2-40 deg at eval, AROM only due to pain)    MMT: R quads 4/5, hamstrings 5/5. R quad set Fair (QS Poor at eval).    RLE dynamic strength and balance 79% of LLE via single leg step test.      Assessment: Tolerated treatment fair. Patient would benefit from continued PT.  Pt apprehension, \"fear\" playing a role in her recovery.  Pt apprehensive about flexing knee, weaning from assistive devices, driving, and attempting steps.  Ongoing positive reinforcement from PT team, encouraging pt to progress as safety permits.  Encouraged pt to practice driving in open parking lot when she feels confident in her reaction time.    Rayne Plasencia has been compliant with attending PT and home exercise program since initial eval.  Rayne  " "has made improvements in objective data since initial eval but is still limited compared to prior level of function. Rayne continues with above listed impairments and would benefit from additional skilled PT to address these deficits to return to prior level of function.  Rayne has attended 14 visits, missed 1 visits.    Goals  Impairment Goals  - Pt I with initial HEP in 1-2 visits - met  - R quad set Good in 2 wks - not met  - Improve ROM equal to WFL in 4-6 weeks - not met  - Increase strength to 5/5 in all affected areas in 4-6 weeks - not met    Functional Goals  - Increase Functional Status Measure to: 52 in 4-6  weeks (score 11 at eval) - not met  - Patient will be independent with comprehensive HEP in 4-6 weeks - not met  - Ambulation is improved to prior level of function in 4-6 weeks - not met  - Stair climbing is improved to prior level of function in 4-6 weeks - not met  - RLE dynamic strength and balance at least 90% of LLE via single leg step test, in 4-6 wks. - not met    New Goals 2-4 wks  Impairment Goals  - R quad set Good  - Improve Flexion ROM equal to WFL   - Increase quad strength to 5/5     Functional Goals  - Increase Functional Status Measure to: 52 (score 11 at eval)  - Patient will be independent with comprehensive HEP   - Ambulation is improved to prior level of function, no AD, non-antalgic  - Stair climbing is improved to prior level of function, reciprocal pattern with rail PRN  - RLE dynamic strength and balance at least 90% of LLE via single leg step test      Plan: Continue per plan of care.      Precautions: PMH R tibia stress fx May 2024, depression, anxiety.        Manuals 1/15 1/16 1/20 1/22      1/13   Patellar glides sup/inf Patellar PROM VIRI            Man str flexion supine    20\"x5 x5                                  Neuro Re-Ed             QS Ankle roll 10''  x30  x30 x30 x30 D/C     x30   Clock taps F,lat  nv 2x10 F 3x10 ea                                                   " "                       Ther Ex                          Short sit ext    3' D/C        Wall slide flexion 10''  x30 x30 x30 x30      X30    Wall gastroc str 20''x5 x5 X5  x5      x5   SLR X25 0#,  1x10 1# 2#  3x10 3x10 D/C      3x10   SAQ 2# 3x10 3# 3x10 3x10 D/C      3x10   LAQ 2#  3x10 3# 3x10 3x10 3x10 4#      3x10    Straddle HS str 20''x5 x5 x5 x5         Bike for ROM Part to full 10' 10' full 10' full 10'      10'   Stand flexion foot on chair 10'' x30 x30 x30 x30      x30   TKE w t band Silver 3x10 3x10 3x10 3x10      3x10   Leg Press R seat 6 holes 4pl 3x10 3x10 3x10  3x10      3x10   Ther Activity             T ball squat 3x10 3x10 3x10 3x10      3x10   LSU  4\" 2x10 6'' 2x10 3x10         FSU 6''  3x10 3x10 3x10 3x10 8\"      3x10   Gait Training                                       Modalities             Ice              Russian stim for QS PRN                                          "

## 2025-01-22 NOTE — LETTER
"2025    Ata Stafford MD  [address] [place] [address]    Patient: Rayne Plasencia   YOB: 1970   Date of Visit: 2025     Encounter Diagnosis     ICD-10-CM    1. Unilateral primary osteoarthritis, right knee  M17.11       2. S/P TKR (total knee replacement), right  Z96.651           Dear Dr. Stafford:    Thank you for your recent referral of Rayne Plasencia. Please review the attached evaluation summary from Rayne's recent visit.     Please verify that you agree with the plan of care by signing the attached order.     If you have any questions or concerns, please do not hesitate to call.     I sincerely appreciate the opportunity to share in the care of one of your patients and hope to have another opportunity to work with you in the near future.       Sincerely,    Rajiv Mary, PT      Referring Provider:      I certify that I have read the below Plan of Care and certify the need for these services furnished under this plan of treatment while under my care.                    Ata Stafford MD  [address] [place] [address]  Via Fax: [phone]          RE-EVALUATION:    Today's date: 2025  Patient name: Rayne Plasencia  : 1970  MRN: [id number]  Referring provider: Ata Stafford MD  Dx:   Encounter Diagnosis     ICD-10-CM    1. Unilateral primary osteoarthritis, right knee  M17.11       2. S/P TKR (total knee replacement), right  Z96.651           Start Time: 1042          Subjective: R knee pain 1/10.  \"There's significant improved.\"  Notes pain with prolonged ambulation.  Does not use crutch with ambulation at home, only outside of house.  Not driving yet, \"I don't feel like I have the reflexes yet.  Next ortho f/u 25.   Has not attempted steps.  \"I think a lot of it is I'm afraid, afraid to bend it.\"      Objective: See treatment diary below.  Urged pt to practice driving in large open parking lot, abrupt stops.  Urged pt to " "wean from 1 crutch outside at tolerated.  Urged pt to attempt steps safely, beneficial therapeutic activity.    RE-EVALUATION:    Pain: 1-5/10 R knee (2-10/10 at eval 12/20/24)    FOTO functional score 39, projected score 52.  Score at eval 11.  Higher score = higher function.    Gait: 1 crutch L, 3 point reciprocal pattern.    R knee AROM 1-0-98 deg.  PROM: 1-0-108 deg, manual stretch for flexion by PT; (0-2-40 deg at eval, AROM only due to pain)    MMT: R quads 4/5, hamstrings 5/5. R quad set Fair (QS Poor at eval).    RLE dynamic strength and balance 79% of LLE via single leg step test.      Assessment: Tolerated treatment fair. Patient would benefit from continued PT.  Pt apprehension, \"fear\" playing a role in her recovery.  Pt apprehensive about flexing knee, weaning from assistive devices, driving, and attempting steps.  Ongoing positive reinforcement from PT team, encouraging pt to progress as safety permits.  Encouraged pt to practice driving in open parking lot when she feels confident in her reaction time.    Rayne Plasencia has been compliant with attending PT and home exercise program since initial eval.  Rayne  has made improvements in objective data since initial eval but is still limited compared to prior level of function. Rayne continues with above listed impairments and would benefit from additional skilled PT to address these deficits to return to prior level of function.  Rayne has attended 14 visits, missed 1 visits.    Goals  Impairment Goals  - Pt I with initial HEP in 1-2 visits - met  - R quad set Good in 2 wks - not met  - Improve ROM equal to WFL in 4-6 weeks - not met  - Increase strength to 5/5 in all affected areas in 4-6 weeks - not met    Functional Goals  - Increase Functional Status Measure to: 52 in 4-6  weeks (score 11 at eval) - not met  - Patient will be independent with comprehensive HEP in 4-6 weeks - not met  - Ambulation is improved to prior level of function in 4-6 weeks - " "not met  - Stair climbing is improved to prior level of function in 4-6 weeks - not met  - RLE dynamic strength and balance at least 90% of LLE via single leg step test, in 4-6 wks. - not met    New Goals 2-4 wks  Impairment Goals  - R quad set Good  - Improve Flexion ROM equal to WFL   - Increase quad strength to 5/5     Functional Goals  - Increase Functional Status Measure to: 52 (score 11 at eval)  - Patient will be independent with comprehensive HEP   - Ambulation is improved to prior level of function, no AD, non-antalgic  - Stair climbing is improved to prior level of function, reciprocal pattern with rail PRN  - RLE dynamic strength and balance at least 90% of LLE via single leg step test      Plan: Continue per plan of care.      Precautions: PMH R tibia stress fx May 2024, depression, anxiety.        Manuals 1/15 1/16 1/20 1/22      1/13   Patellar glides sup/inf Patellar PROM VIRI            Man str flexion supine    20\"x5 x5                                  Neuro Re-Ed             QS Ankle roll 10''  x30  x30 x30 x30 D/C     x30   Clock taps F,lat  nv 2x10 F 3x10 ea                                                                          Ther Ex                          Short sit ext    3' D/C        Wall slide flexion 10''  x30 x30 x30 x30      X30    Wall gastroc str 20''x5 x5 X5  x5      x5   SLR X25 0#,  1x10 1# 2#  3x10 3x10 D/C      3x10   SAQ 2# 3x10 3# 3x10 3x10 D/C      3x10   LAQ 2#  3x10 3# 3x10 3x10 3x10 4#      3x10    Straddle HS str 20''x5 x5 x5 x5         Bike for ROM Part to full 10' 10' full 10' full 10'      10'   Stand flexion foot on chair 10'' x30 x30 x30 x30      x30   TKE w t band Silver 3x10 3x10 3x10 3x10      3x10   Leg Press R seat 6 holes 4pl 3x10 3x10 3x10  3x10      3x10   Ther Activity             T ball squat 3x10 3x10 3x10 3x10      3x10   LSU  4\" 2x10 6'' 2x10 3x10         FSU 6''  3x10 3x10 3x10 3x10 8\"      3x10   Gait Training                                     "   Modalities             Ice              Russian stim for QS PRN

## 2025-01-23 ENCOUNTER — OFFICE VISIT (OUTPATIENT)
Dept: PHYSICAL THERAPY | Facility: CLINIC | Age: 55
End: 2025-01-23
Payer: COMMERCIAL

## 2025-01-23 DIAGNOSIS — M17.11 UNILATERAL PRIMARY OSTEOARTHRITIS, RIGHT KNEE: Primary | ICD-10-CM

## 2025-01-23 DIAGNOSIS — Z96.651 S/P TKR (TOTAL KNEE REPLACEMENT), RIGHT: ICD-10-CM

## 2025-01-23 PROCEDURE — 97112 NEUROMUSCULAR REEDUCATION: CPT | Performed by: PHYSICAL THERAPIST

## 2025-01-23 PROCEDURE — 97110 THERAPEUTIC EXERCISES: CPT | Performed by: PHYSICAL THERAPIST

## 2025-01-23 PROCEDURE — 97530 THERAPEUTIC ACTIVITIES: CPT | Performed by: PHYSICAL THERAPIST

## 2025-01-23 NOTE — PROGRESS NOTES
"Daily Note     Today's date: 2025  Patient name: Rayne Plasencia  : 1970  MRN: [id number]  Referring provider: Ata Stafford MD  Dx:   Encounter Diagnosis     ICD-10-CM    1. Unilateral primary osteoarthritis, right knee  M17.11       2. S/P TKR (total knee replacement), right  Z96.651           Start Time: 1553          Subjective: R knee pain 0-1/10, went shopping with no AD, did steps for first time post-op, in step-to pattern, attempted reciprocal pattern.  \"I think I was babying it.\"      Objective: See treatment diary below.  Flexion PROM 104 deg with max PT assist.      Assessment: Tolerated treatment fair. Patient would benefit from continued PT. Pt stopping PT with manual flexion stretch prior to firm end feel with ROM, with pt using compensatory hip hiking to avoid terminal ROM stretch.      Plan: Continue per plan of care.      Precautions: PMH R tibia stress fx May 2024, depression, anxiety.        Manuals 1/15 1/16 1/20 1/22 1/23     1/13   Patellar glides sup/inf Patellar PROM VIRI            Man str flexion supine    20\"x5 x5                                  Neuro Re-Ed             QS Ankle roll 10''  x30  x30 x30 x30 D/C     x30   Clock taps F,lat  nv 2x10 F 3x10 ea 3x10 ea                                                                         Ther Ex                          Short sit ext    3' D/C        Wall slide flexion 10''  x30 x30 x30 x30 x30     X30    Wall gastroc str 20''x5 x5 X5  x5 x5     x5   SLR X25 0#,  1x10 1# 2#  3x10 3x10 D/C      3x10   Straddle quad str w strap     20\"x5        SAQ 2# 3x10 3# 3x10 3x10 D/C      3x10   LAQ 2#  3x10 3# 3x10 3x10 3x10 4# 3x10     3x10    Straddle HS str 20''x5 x5 x5 x5 x5        Bike for ROM Part to full 10' 10' full 10' full 10' 10'     10'   Stand flexion foot on chair 10'' x30 x30 x30 x30 x30     x30   TKE w t band Silver 3x10 3x10 3x10 3x10 3x10     3x10   Leg Press R seat 6 holes 4pl 3x10 3x10 3x10  3x10 3x10 6pl    3x10   Ther " "Activity             T ball squat 3x10 3x10 3x10 3x10 3x10      3x10   LSU  4\" 2x10 6'' 2x10 3x10 3x10 8\"        FSU 6''  3x10 3x10 3x10 3x10 8\" 3x10     3x10   Gait Training                                       Modalities             Ice              Russian stim for QS PRN                                            "

## 2025-01-27 ENCOUNTER — OFFICE VISIT (OUTPATIENT)
Dept: PHYSICAL THERAPY | Facility: CLINIC | Age: 55
End: 2025-01-27
Payer: COMMERCIAL

## 2025-01-27 DIAGNOSIS — Z96.651 S/P TKR (TOTAL KNEE REPLACEMENT), RIGHT: ICD-10-CM

## 2025-01-27 DIAGNOSIS — M17.11 UNILATERAL PRIMARY OSTEOARTHRITIS, RIGHT KNEE: Primary | ICD-10-CM

## 2025-01-27 PROCEDURE — 97112 NEUROMUSCULAR REEDUCATION: CPT | Performed by: PHYSICAL THERAPIST

## 2025-01-27 PROCEDURE — 97530 THERAPEUTIC ACTIVITIES: CPT | Performed by: PHYSICAL THERAPIST

## 2025-01-27 PROCEDURE — 97110 THERAPEUTIC EXERCISES: CPT | Performed by: PHYSICAL THERAPIST

## 2025-01-27 NOTE — PROGRESS NOTES
"Daily Note     Today's date: 2025  Patient name: Rayne Plasencia  : 1970  MRN: [id number]  Referring provider: Ata Stafford MD  Dx:   Encounter Diagnosis     ICD-10-CM    1. Unilateral primary osteoarthritis, right knee  M17.11       2. S/P TKR (total knee replacement), right  Z96.651                      Subjective: pt reports walking dog yesterday, dog pulled pt and pt twisted, felt non-painful \"pop\" anterior aspect R knee \"like something shifted, I'm hopeful maybe I broke  some scar tissue.\"  R knee pain \"minimal\" today.      Objective: See treatment diary below.  R knee with no significant edema, erythema, or ecchymosis increase vs prior visits. Pt ambulating to PT with no AD, mildly antalgic RLE.  Flexion PROM 110 deg.      Assessment: Tolerated treatment well. Patient would benefit from continued PT. ROM slowly improving.      Plan: Continue per plan of care.      Precautions: PMH R tibia stress fx May 2024, depression, anxiety.        Manuals 1/15 1/16 1/20 1/22 1/23 1/27    1/13   Patellar glides sup/inf Patellar PROM VIRI            Man str flexion supine    20\"x5 x5 x5                                 Neuro Re-Ed             QS Ankle roll 10''  x30  x30 x30 x30 D/C     x30   Clock taps F,lat  nv 2x10 F 3x10 ea 3x10 ea 3x10 ea                                                                        Ther Ex                          Short sit ext    3' D/C        Wall slide flexion 10''  x30 x30 x30 x30 x30 x30    X30    Wall gastroc str 20''x5 x5 X5  x5 x5 x5    x5   SLR X25 0#,  1x10 1# 2#  3x10 3x10 D/C      3x10   Straddle quad str w strap     20\"x5 x5       SAQ 2# 3x10 3# 3x10 3x10 D/C      3x10   LAQ 2#  3x10 3# 3x10 3x10 3x10 4# 3x10 3x10    3x10    Straddle HS str 20''x5 x5 x5 x5 x5 x5       Bike for ROM Part to full 10' 10' full 10' full 10' 10' 10' Nu Step   10'   Stand flexion foot on chair 10'' x30 x30 x30 x30 x30 x30    x30   TKE w t band Silver 3x10 3x10 3x10 3x10 3x10 3x10    3x10   Leg " "Press R seat 6 holes 4pl 3x10 3x10 3x10  3x10 3x10 6pl 3x10    3x10   Ther Activity             T ball squat 3x10 3x10 3x10 3x10 3x10  3x10    3x10   LSU  4\" 2x10 6'' 2x10 3x10 3x10 8\" 3x10       FSU 6''  3x10 3x10 3x10 3x10 8\" 3x10 3x10    3x10   Gait Training                                       Modalities             Ice              Russian stim for QS PRN                                              "

## 2025-01-29 ENCOUNTER — OFFICE VISIT (OUTPATIENT)
Dept: PHYSICAL THERAPY | Facility: CLINIC | Age: 55
End: 2025-01-29
Payer: COMMERCIAL

## 2025-01-29 DIAGNOSIS — Z96.651 S/P TKR (TOTAL KNEE REPLACEMENT), RIGHT: ICD-10-CM

## 2025-01-29 DIAGNOSIS — M17.11 UNILATERAL PRIMARY OSTEOARTHRITIS, RIGHT KNEE: Primary | ICD-10-CM

## 2025-01-29 PROCEDURE — 97110 THERAPEUTIC EXERCISES: CPT | Performed by: PHYSICAL THERAPIST

## 2025-01-29 PROCEDURE — 97530 THERAPEUTIC ACTIVITIES: CPT | Performed by: PHYSICAL THERAPIST

## 2025-01-29 PROCEDURE — 97112 NEUROMUSCULAR REEDUCATION: CPT | Performed by: PHYSICAL THERAPIST

## 2025-01-29 NOTE — PROGRESS NOTES
"Daily Note     Today's date: 2025  Patient name: Rayne Plasencia  : 1970  MRN: [id number]  Referring provider: Ata Stafford MD  Dx:   Encounter Diagnosis     ICD-10-CM    1. Unilateral primary osteoarthritis, right knee  M17.11       2. S/P TKR (total knee replacement), right  Z96.651                      Subjective: \"I did some really good bending this morning, I'm hopeful.\"  R knee pain 0-1/10 prior to tx.      Objective: See treatment diary below.  Flexion PROM 113 deg.      Assessment: Tolerated treatment well. Patient exhibited good technique with therapeutic exercises and would benefit from continued PT  Flexion ROM slowly improving.      Plan: Continue per plan of care.      Precautions: PMH R tibia stress fx May 2024, depression, anxiety.        Manuals 1/15 1/16 1/20 1/22 1/23 1/27 1/29      Patellar glides sup/inf Patellar PROM VIRI      G4      Man str flexion supine    20\"x5 x5 x5 x5                                Neuro Re-Ed             QS Ankle roll 10''  x30  x30 x30 x30 D/C        Clock taps F,lat  nv 2x10 F 3x10 ea 3x10 ea 3x10 ea 3x10 ea                                                                       Ther Ex                          Short sit ext    3' D/C        Wall slide flexion 10''  x30 x30 x30 x30 x30 x30 x30      Wall gastroc str 20''x5 x5 X5  x5 x5 x5 x5      SLR X25 0#,  1x10 1# 2#  3x10 3x10 D/C         Straddle quad str w strap     20\"x5 x5 x5      SAQ 2# 3x10 3# 3x10 3x10 D/C         LAQ 2#  3x10 3# 3x10 3x10 3x10 4# 3x10 3x10 5# 3x10      Straddle HS str 20''x5 x5 x5 x5 x5 x5 x5      Bike for ROM Part to full 10' 10' full 10' full 10' 10' 10' Nu Step 10' L6      Stand flexion foot on chair 10'' x30 x30 x30 x30 x30 x30 x30      TKE w t band Silver 3x10 3x10 3x10 3x10 3x10 3x10 3x10      Leg Press R seat 6 holes 4pl 3x10 3x10 3x10  3x10 3x10 6pl 3x10 3x10      Ther Activity             T ball squat 3x10 3x10 3x10 3x10 3x10  3x10 3x10      LSU  4\" 2x10 6'' 2x10 3x10 3x10 " "8\" 3x10 3x10      FSU 6''  3x10 3x10 3x10 3x10 8\" 3x10 3x10 3x10      Gait Training                                       Modalities             Ice                                                              "

## 2025-01-31 ENCOUNTER — OFFICE VISIT (OUTPATIENT)
Dept: PHYSICAL THERAPY | Facility: CLINIC | Age: 55
End: 2025-01-31
Payer: COMMERCIAL

## 2025-01-31 DIAGNOSIS — M17.11 UNILATERAL PRIMARY OSTEOARTHRITIS, RIGHT KNEE: Primary | ICD-10-CM

## 2025-01-31 DIAGNOSIS — Z96.651 S/P TKR (TOTAL KNEE REPLACEMENT), RIGHT: ICD-10-CM

## 2025-01-31 PROCEDURE — 97112 NEUROMUSCULAR REEDUCATION: CPT | Performed by: PHYSICAL THERAPIST

## 2025-01-31 PROCEDURE — 97110 THERAPEUTIC EXERCISES: CPT | Performed by: PHYSICAL THERAPIST

## 2025-01-31 PROCEDURE — 97530 THERAPEUTIC ACTIVITIES: CPT | Performed by: PHYSICAL THERAPIST

## 2025-01-31 NOTE — PROGRESS NOTES
"Daily Note     Today's date: 2025  Patient name: Rayne Plasencia  : 1970  MRN: [id number]  Referring provider: Ata Stafford MD  Dx:   Encounter Diagnosis     ICD-10-CM    1. Unilateral primary osteoarthritis, right knee  M17.11       2. S/P TKR (total knee replacement), right  Z96.651                      Subjective: \"I drove today.\"  R knee pain 0/10.      Objective: See treatment diary below.  Flexion PROM 117 deg.      Assessment: Tolerated treatment well. Patient exhibited good technique with therapeutic exercises and would benefit from continued PT. Flexion ROM continues to slowly improve.      Plan: Continue per plan of care.      Precautions: PMH R tibia stress fx May 2024, depression, anxiety.        Manuals 1/15 1/16 1/20 1/22 1/23 1/27 1/29 1/31     Patellar glides sup/inf Patellar PROM VIRI      G4 G4     Man str flexion supine    20\"x5 x5 x5 x5 x5                               Neuro Re-Ed             QS Ankle roll 10''  x30  x30 x30 x30 D/C        Clock taps F,lat  nv 2x10 F 3x10 ea 3x10 ea 3x10 ea 3x10 ea 3x10 ea                                                                      Ther Ex                          Short sit ext    3' D/C        Wall slide flexion 10''  x30 x30 x30 x30 x30 x30 x30 x30     Wall gastroc str 20''x5 x5 X5  x5 x5 x5 x5 x5     SLR X25 0#,  1x10 1# 2#  3x10 3x10 D/C         Straddle quad str w strap     20\"x5 x5 x5 x5     SAQ 2# 3x10 3# 3x10 3x10 D/C         LAQ 2#  3x10 3# 3x10 3x10 3x10 4# 3x10 3x10 5# 3x10 3x10     Straddle HS str 20''x5 x5 x5 x5 x5 x5 x5 x5     Bike for ROM Part to full 10' 10' full 10' full 10' 10' 10' Nu Step 10' L6 10'     Stand flexion foot on chair 10'' x30 x30 x30 x30 x30 x30 x30 x30     TKE w t band Silver 3x10 3x10 3x10 3x10 3x10 3x10 3x10 3x10     Leg Press R seat 6 holes 4pl 3x10 3x10 3x10  3x10 3x10 6pl 3x10 3x10 3x10     Ther Activity             T ball squat 3x10 3x10 3x10 3x10 3x10  3x10 3x10 3x10     LSU  4\" 2x10 6'' 2x10 3x10 3x10 " "8\" 3x10 3x10 3x10     FSU 6''  3x10 3x10 3x10 3x10 8\" 3x10 3x10 3x10 3x10     Gait Training                                       Modalities             Ice                                                                "

## 2025-02-03 ENCOUNTER — OFFICE VISIT (OUTPATIENT)
Dept: PHYSICAL THERAPY | Facility: CLINIC | Age: 55
End: 2025-02-03
Payer: COMMERCIAL

## 2025-02-03 DIAGNOSIS — M17.11 UNILATERAL PRIMARY OSTEOARTHRITIS, RIGHT KNEE: Primary | ICD-10-CM

## 2025-02-03 DIAGNOSIS — Z96.651 S/P TKR (TOTAL KNEE REPLACEMENT), RIGHT: ICD-10-CM

## 2025-02-03 PROCEDURE — 97110 THERAPEUTIC EXERCISES: CPT

## 2025-02-03 PROCEDURE — 97530 THERAPEUTIC ACTIVITIES: CPT

## 2025-02-03 NOTE — PROGRESS NOTES
"Daily Note     Today's date: 2/3/2025  Patient name: Rayne Plasencia  : 1970  MRN: [id number]  Referring provider: Ata Stafford MD  Dx:   Encounter Diagnosis     ICD-10-CM    1. Unilateral primary osteoarthritis, right knee  M17.11       2. S/P TKR (total knee replacement), right  Z96.651                      Subjective: Pt states she still has a hard time describing how her R knee feels, other than tightness/stiffness. Notes residual numbness along lateral knee but feels like she is starting to turn a corner in terms of ROM, function. Pt is walking independently now and driving herself.  Pt adds she did a lot more walking this weekend, \"it's probably mad at me.\"   Ortho f/u 25.      Objective: See treatment diary below  R knee AAROM flexion: 110 degrees    Assessment: Tolerated treatment well. Patient would benefit from continued PT For improved strength, flexibility and overall function.  Held clock taps today as pt with increased R knee pain following today's session. Resume nv as tolerable.    Plan: Continue per plan of care.      Precautions: PMH R tibia stress fx May 2024, depression, anxiety.        Manuals 1/15 1/16 1/20 1/22 1/23 1/27 1/29 1/31 2/3    Patellar glides sup/inf Patellar PROM VIRI      G4 G4 VIRI    Man str flexion supine    20\"x5 x5 x5 x5 x5 x5                              Neuro Re-Ed             QS Ankle roll 10''  x30  x30 x30 x30 D/C        Clock taps F,lat  nv 2x10 F 3x10 ea 3x10 ea 3x10 ea 3x10 ea 3x10 ea nv                                                                     Ther Ex                          Short sit ext    3' D/C        Wall slide flexion 10''  x30 x30 x30 x30 x30 x30 x30 x30 x30    Wall gastroc str 20''x5 x5 X5  x5 x5 x5 x5 x5 X5     SLR X25 0#,  1x10 1# 2#  3x10 3x10 D/C         Straddle quad str w strap     20\"x5 x5 x5 x5 x5    SAQ 2# 3x10 3# 3x10 3x10 D/C         LAQ 2#  3x10 3# 3x10 3x10 3x10 4# 3x10 3x10 5# 3x10 3x10 3x10    Straddle HS str 20''x5 x5 x5 x5 " "x5 x5 x5 x5 x5    Bike for ROM Part to full 10' 10' full 10' full 10' 10' 10' Nu Step 10' L6 10' 10'    Stand flexion foot on chair 10'' x30 x30 x30 x30 x30 x30 x30 x30 x30    TKE w t band Silver 3x10 3x10 3x10 3x10 3x10 3x10 3x10 3x10 3x10    Leg Press R seat 6 holes 4pl 3x10 3x10 3x10  3x10 3x10 6pl 3x10 3x10 3x10 3x10     Ther Activity             T ball squat 3x10 3x10 3x10 3x10 3x10  3x10 3x10 3x10 3x10    LSU  4\" 2x10 6'' 2x10 3x10 3x10 8\" 3x10 3x10 3x10 3x10    FSU 6''  3x10 3x10 3x10 3x10 8\" 3x10 3x10 3x10 3x10 3x10    Gait Training                                       Modalities             Ice                                                                  "

## 2025-02-04 DIAGNOSIS — Z00.6 ENCOUNTER FOR EXAMINATION FOR NORMAL COMPARISON OR CONTROL IN CLINICAL RESEARCH PROGRAM: ICD-10-CM

## 2025-02-05 ENCOUNTER — OFFICE VISIT (OUTPATIENT)
Dept: PHYSICAL THERAPY | Facility: CLINIC | Age: 55
End: 2025-02-05
Payer: COMMERCIAL

## 2025-02-05 DIAGNOSIS — Z96.651 S/P TKR (TOTAL KNEE REPLACEMENT), RIGHT: ICD-10-CM

## 2025-02-05 DIAGNOSIS — M17.11 UNILATERAL PRIMARY OSTEOARTHRITIS, RIGHT KNEE: Primary | ICD-10-CM

## 2025-02-05 PROCEDURE — 97530 THERAPEUTIC ACTIVITIES: CPT

## 2025-02-05 PROCEDURE — 97112 NEUROMUSCULAR REEDUCATION: CPT

## 2025-02-05 PROCEDURE — 97110 THERAPEUTIC EXERCISES: CPT

## 2025-02-05 NOTE — PROGRESS NOTES
"Daily Note     Today's date: 2025  Patient name: Rayne Plasencia  : 1970  MRN: [id number]  Referring provider: Ata Stafford MD  Dx:   Encounter Diagnosis     ICD-10-CM    1. Unilateral primary osteoarthritis, right knee  M17.11       2. S/P TKR (total knee replacement), right  Z96.651                      Subjective:  \"Better. It still feels wonky on the side, it's tight every morning. Monday was a rough day for me.\"    Pt c/o's of R knee stiffness/tightness, typically worse in the morning.       Objective: See treatment diary below  R distal ITB TTP    Assessment: Tolerated treatment well. Patient would benefit from continued PT For improved strength, flexibility and overall function.  Attempted ITB stretch to help address R knee sx but pt did not feel any stretch.   Improved soft tissue mobility, decreased pain overall with R knee flexion today.     Plan: Continue per plan of care.      Precautions: PMH R tibia stress fx May 2024, depression, anxiety.        Manuals 1/15 1/16 1/20 1/22 1/23 1/27 1/29 1/31 2/3 2/   Patellar glides sup/inf Patellar PROM VIRI      G4 G4 VIRI VIRI   Man str flexion supine    20\"x5 x5 x5 x5 x5 x5 x5                             Neuro Re-Ed             QS Ankle roll 10''  x30  x30 x30 x30 D/C        Clock taps F,lat  nv 2x10 F 3x10 ea 3x10 ea 3x10 ea 3x10 ea 3x10 ea nv 3x10 ea                                                                     Ther Ex             Wall ITB stretch          No str   Short sit ext    3' D/C        Wall slide flexion 10''  x30 x30 x30 x30 x30 x30 x30 x30 x30 X30    Wall gastroc str 20''x5 x5 X5  x5 x5 x5 x5 x5 X5  x5   SLR X25 0#,  1x10 1# 2#  3x10 3x10 D/C         Straddle quad str w strap     20\"x5 x5 x5 x5 x5 X5    SAQ 2# 3x10 3# 3x10 3x10 D/C         LAQ 2#  3x10 3# 3x10 3x10 3x10 4# 3x10 3x10 5# 3x10 3x10 3x10 3x10    Straddle HS str 20''x5 x5 x5 x5 x5 x5 x5 x5 x5 x5   Bike for ROM Part to full 10' 10' full 10' full 10' 10' 10' Nu Step 10' L6 10' " "10' 10'    Stand flexion foot on chair 10'' x30 x30 x30 x30 x30 x30 x30 x30 x30 x30   TKE w t band Silver 3x10 3x10 3x10 3x10 3x10 3x10 3x10 3x10 3x10 3x10   Leg Press R seat 6 holes 4pl 3x10 3x10 3x10  3x10 3x10 6pl 3x10 3x10 3x10 3x10  3x10   Ther Activity             T ball squat 3x10 3x10 3x10 3x10 3x10  3x10 3x10 3x10 3x10 3x10   LSU  4\" 2x10 6'' 2x10 3x10 3x10 8\" 3x10 3x10 3x10 3x10 3x10   FSU 6''  3x10 3x10 3x10 3x10 8\" 3x10 3x10 3x10 3x10 3x10 3x10   Gait Training                                       Modalities             Ice                                                                    "

## 2025-02-06 ENCOUNTER — APPOINTMENT (OUTPATIENT)
Dept: PHYSICAL THERAPY | Facility: CLINIC | Age: 55
End: 2025-02-06
Payer: COMMERCIAL

## 2025-02-06 NOTE — PROGRESS NOTES
"Daily Note     Today's date: 2025  Patient name: Rayne Plasencia  : 1970  MRN: [id number]  Referring provider: Ata Stafford MD  Dx:   Encounter Diagnosis     ICD-10-CM    1. Unilateral primary osteoarthritis, right knee  M17.11       2. S/P TKR (total knee replacement), right  Z96.651                      Subjective: The patient states that her knee is feeling stiff today.  Reports working at home to continue to help improve knee flexion.          Objective: See treatment diary below.  Knee flexion AROM: 115*, PROM: 117*.       Assessment: Tolerated treatment well.  Tightness noted at end range of knee flexion with manual stretch though ROM did improve from earlier this week.  Patient demonstrated fatigue post treatment and would benefit from continued PT      Plan: Continue per plan of care.      Precautions: PMH R tibia stress fx May 2024, depression, anxiety.        Manuals 2/7 1/16 1/20 1/22 1/23 1/27 1/29 1/31 2/3 2   Patellar glides sup/inf ML      G4 G4 VIRI VIRI   Man str flexion supine 5x   20\"x5 x5 x5 x5 x5 x5 x5                             Neuro Re-Ed             QS  x30 x30 x30 D/C        Clock taps F,lat 3x10 ea nv 2x10 F 3x10 ea 3x10 ea 3x10 ea 3x10 ea 3x10 ea nv 3x10 ea                                                                     Ther Ex             Wall ITB stretch          No str   Short sit ext    3' D/C        Wall slide flexion 30x x30 x30 x30 x30 x30 x30 x30 x30 X30    Wall gastroc str 5x x5 X5  x5 x5 x5 x5 x5 X5  x5   SLR  2#  3x10 3x10 D/C         Straddle quad str w strap 5x    20\"x5 x5 x5 x5 x5 X5    SAQ  3# 3x10 3x10 D/C         LAQ 5#  3x10 3# 3x10 3x10 3x10 4# 3x10 3x10 5# 3x10 3x10 3x10 3x10    Straddle HS str 20''x5 x5 x5 x5 x5 x5 x5 x5 x5 x5   Bike for ROM 10' 10' full 10' full 10' 10' 10' Nu Step 10' L6 10' 10' 10'    Stand flexion foot on chair 30x x30 x30 x30 x30 x30 x30 x30 x30 x30   TKE w t band Silver 3x10 3x10 3x10 3x10 3x10 3x10 3x10 3x10 3x10 3x10   Leg Press R " "seat 6 holes 6pl 3x10 3x10 3x10  3x10 3x10 6pl 3x10 3x10 3x10 3x10  3x10   Ther Activity             T ball squat 3x10 3x10 3x10 3x10 3x10  3x10 3x10 3x10 3x10 3x10   LSU 8\"  3x10 4\" 2x10 6'' 2x10 3x10 3x10 8\" 3x10 3x10 3x10 3x10 3x10   FSU 8''  3x10 3x10 3x10 3x10 8\" 3x10 3x10 3x10 3x10 3x10 3x10   Gait Training                                       Modalities             Ice                                                                      "

## 2025-02-07 ENCOUNTER — OFFICE VISIT (OUTPATIENT)
Dept: PHYSICAL THERAPY | Facility: CLINIC | Age: 55
End: 2025-02-07
Payer: COMMERCIAL

## 2025-02-07 DIAGNOSIS — M17.11 UNILATERAL PRIMARY OSTEOARTHRITIS, RIGHT KNEE: Primary | ICD-10-CM

## 2025-02-07 DIAGNOSIS — Z96.651 S/P TKR (TOTAL KNEE REPLACEMENT), RIGHT: ICD-10-CM

## 2025-02-07 PROCEDURE — 97110 THERAPEUTIC EXERCISES: CPT | Performed by: PHYSICAL THERAPIST

## 2025-02-07 PROCEDURE — 97530 THERAPEUTIC ACTIVITIES: CPT | Performed by: PHYSICAL THERAPIST

## 2025-02-10 ENCOUNTER — OFFICE VISIT (OUTPATIENT)
Dept: PHYSICAL THERAPY | Facility: CLINIC | Age: 55
End: 2025-02-10
Payer: COMMERCIAL

## 2025-02-10 DIAGNOSIS — M17.11 UNILATERAL PRIMARY OSTEOARTHRITIS, RIGHT KNEE: Primary | ICD-10-CM

## 2025-02-10 DIAGNOSIS — Z96.651 S/P TKR (TOTAL KNEE REPLACEMENT), RIGHT: ICD-10-CM

## 2025-02-10 PROCEDURE — 97112 NEUROMUSCULAR REEDUCATION: CPT

## 2025-02-10 PROCEDURE — 97530 THERAPEUTIC ACTIVITIES: CPT

## 2025-02-10 PROCEDURE — 97110 THERAPEUTIC EXERCISES: CPT

## 2025-02-10 NOTE — PROGRESS NOTES
"Daily Note     Today's date: 2/10/2025  Patient name: Rayne Plasencia  : 1970  MRN: [id number]  Referring provider: Ata Stafford MD  Dx:   Encounter Diagnosis     ICD-10-CM    1. Unilateral primary osteoarthritis, right knee  M17.11       2. S/P TKR (total knee replacement), right  Z96.651                      Subjective: \"It's annoying enough that I can't get sleep.\" Pt c/o's of R knee tightness, specifically along lateral R knee. States she was able to ride her [employer] bike at home for 10 minutes over the weekend but had to adjust her seat high enough that she was able to get full revolution.  Ortho f/u 25. Pt has not yet returned to work.      Objective: See treatment diary below  TTP at distal ITB    Assessment: Tolerated treatment well. Patient would benefit from continued PT For improved strength, flexibility and overall function.  Although still painful, pt exhibited less hip hiking with manual stretching.       Plan: Continue per plan of care.      Precautions: PMH R tibia stress fx May 2024, depression, anxiety.        Manuals 2/7 2/10        2/   Patellar glides sup/inf ML VIRI        VIRI   Man str flexion supine 5x 20''  x5        x5                             Neuro Re-Ed                          Clock taps F,lat 3x10 ea 3x10 ea         3x10 ea                                                                     Ther Ex                                       Wall slide flexion 30x 10''  x30        X30    Wall gastroc str 5x 20''  x5        x5                Straddle quad str w strap 5x 20''  x5        X5                 LAQ 5#  3x10 3x10        3x10    Straddle HS str 20''x5 x5        x5   NuStep for strength 10' 10'        10'    Stand flexion foot on chair 30x 10''  x30        x30   TKE w t band Silver 3x10 3x10        3x10   Leg Press R seat 6 holes 6pl 3x10 3x10        3x10   Ther Activity             T ball squat 3x10 3x10        3x10   LSU 8\"  3x10 3x10        3x10   FSU 8''  3x10 3x10      "   3x10   Gait Training                                       Modalities             Ice

## 2025-02-11 ENCOUNTER — OFFICE VISIT (OUTPATIENT)
Dept: PHYSICAL THERAPY | Facility: CLINIC | Age: 55
End: 2025-02-11
Payer: COMMERCIAL

## 2025-02-11 DIAGNOSIS — Z96.651 S/P TKR (TOTAL KNEE REPLACEMENT), RIGHT: ICD-10-CM

## 2025-02-11 DIAGNOSIS — M17.11 UNILATERAL PRIMARY OSTEOARTHRITIS, RIGHT KNEE: Primary | ICD-10-CM

## 2025-02-11 PROCEDURE — 97112 NEUROMUSCULAR REEDUCATION: CPT | Performed by: PHYSICAL THERAPIST

## 2025-02-11 PROCEDURE — 97110 THERAPEUTIC EXERCISES: CPT | Performed by: PHYSICAL THERAPIST

## 2025-02-11 NOTE — PROGRESS NOTES
"Daily Note     Today's date: 2025  Patient name: Rayne Plasencia  : 1970  MRN: [id number]  Referring provider: Ata Stafford MD  Dx:   Encounter Diagnosis     ICD-10-CM    1. Unilateral primary osteoarthritis, right knee  M17.11       2. S/P TKR (total knee replacement), right  Z96.651                      Subjective: patient reports continued pain in the lateral aspect of the knee. She also notes pain in quad this morning as well. She put heat on it and it felt a little better.      Objective: See treatment diary below      Assessment: Tolerated treatment well. She continues to be challenged with end range flexion but does well with EOT stretching today. Continued with program as noted below. Patient demonstrated fatigue post treatment, exhibited good technique with therapeutic exercises, and would benefit from continued PT      Plan: Continue per plan of care.      Precautions: PMH R tibia stress fx May 2024, depression, anxiety.        Manuals 2/7 2/10 2/11       2   Patellar glides sup/inf ML VIRI SK       VIRI   Man str flexion supine 5x 20''  x5 Supine + EOT       x5                             Neuro Re-Ed                          Clock taps F,lat 3x10 ea 3x10 ea  3x10 ea       3x10 ea                                                                     Ther Ex                                       Wall slide flexion 30x 10''  x30 x30       X30    Wall gastroc str 5x 20''  x5 x5       x5                Straddle quad str w strap 5x 20''  x5        X5                 LAQ 5#  3x10 3x10 3x10       3x10    Straddle HS str 20''x5 x5 x5       x5   NuStep for strength 10' 10' 10'       10'    Stand flexion foot on chair 30x 10''  x30 x30       x30   TKE w t band Silver 3x10 3x10 3x10       3x10   Leg Press R seat 6 holes 6pl 3x10 3x10 3x10       3x10   Ther Activity             T ball squat 3x10 3x10 3x10       3x10   LSU 8\"  3x10 3x10 3x10       3x10   FSU 8''  3x10 3x10 3x10       3x10   Gait Training        "                                Modalities             Ice

## 2025-02-13 ENCOUNTER — OFFICE VISIT (OUTPATIENT)
Dept: PHYSICAL THERAPY | Facility: CLINIC | Age: 55
End: 2025-02-13
Payer: COMMERCIAL

## 2025-02-13 DIAGNOSIS — M17.11 UNILATERAL PRIMARY OSTEOARTHRITIS, RIGHT KNEE: Primary | ICD-10-CM

## 2025-02-13 DIAGNOSIS — Z96.651 S/P TKR (TOTAL KNEE REPLACEMENT), RIGHT: ICD-10-CM

## 2025-02-13 PROCEDURE — 97110 THERAPEUTIC EXERCISES: CPT | Performed by: PHYSICAL THERAPIST

## 2025-02-13 PROCEDURE — 97140 MANUAL THERAPY 1/> REGIONS: CPT | Performed by: PHYSICAL THERAPIST

## 2025-02-13 PROCEDURE — 97112 NEUROMUSCULAR REEDUCATION: CPT | Performed by: PHYSICAL THERAPIST

## 2025-02-13 NOTE — PROGRESS NOTES
"Daily Note     Today's date: 2025  Patient name: Rayne Plasencia  : 1970  MRN: [id number]  Referring provider: Ata Stafford MD  Dx:   Encounter Diagnosis     ICD-10-CM    1. Unilateral primary osteoarthritis, right knee  M17.11       2. S/P TKR (total knee replacement), right  Z96.651                      Subjective: Patient reports she is feeling okay this morning. She mentions that she was able to schedule an appointment to see the PA this afternoon for her knee. She mentions that she is frustrated and feels like she should be further along.      Objective: See treatment diary below  Passive knee flexion 113 degrees today.    Assessment: Tolerated treatment well. She continues to be challenged with end range flexion but continues to do well with EOT stretching. Continued with program as noted below.  Patient demonstrated fatigue post treatment, exhibited good technique with therapeutic exercises, and would benefit from continued PT      Plan: Continue per plan of care.        Precautions: PMH R tibia stress fx May 2024, depression, anxiety.        Manuals 2/7 2/10 2/11 2/13      2   Patellar glides sup/inf ML VIRI SK SK      VIRI   Man str flexion supine 5x 20''  x5 Supine + EOT EOT       x5                             Neuro Re-Ed                          Clock taps F,lat 3x10 ea 3x10 ea  3x10 ea 3x10 ea      3x10 ea                                                                     Ther Ex             EOT Self Knee Flexion    5\"x10                      Wall slide flexion 30x 10''  x30 x30 x30      X30    Wall gastroc str 5x 20''  x5 x5 x5      x5                Straddle quad str w strap 5x 20''  x5  x5      X5                 LAQ 5#  3x10 3x10 3x10 3x10      3x10    Straddle HS str 20''x5 x5 x5 x5      x5   NuStep for strength 10' 10' 10' 10'      10'    Stand flexion foot on chair 30x 10''  x30 x30 x30      x30   TKE w t band Silver 3x10 3x10 3x10 3x10      3x10   Leg Press R seat 6 holes 6pl 3x10 " "3x10 3x10 3x10      3x10                Ther Activity             T ball squat 3x10 3x10 3x10 3x10      3x10   LSU 8\"  3x10 3x10 3x10 3x10      3x10   FSU 8''  3x10 3x10 3x10 3x10      3x10   Gait Training                                       Modalities             Ice                                                                            "

## 2025-02-17 ENCOUNTER — OFFICE VISIT (OUTPATIENT)
Dept: PHYSICAL THERAPY | Facility: CLINIC | Age: 55
End: 2025-02-17
Payer: COMMERCIAL

## 2025-02-17 DIAGNOSIS — Z96.651 S/P TKR (TOTAL KNEE REPLACEMENT), RIGHT: ICD-10-CM

## 2025-02-17 DIAGNOSIS — M17.11 UNILATERAL PRIMARY OSTEOARTHRITIS, RIGHT KNEE: Primary | ICD-10-CM

## 2025-02-17 PROCEDURE — 97112 NEUROMUSCULAR REEDUCATION: CPT

## 2025-02-17 PROCEDURE — 97140 MANUAL THERAPY 1/> REGIONS: CPT

## 2025-02-17 PROCEDURE — 97110 THERAPEUTIC EXERCISES: CPT

## 2025-02-17 NOTE — PROGRESS NOTES
"Daily Note     Today's date: 2025  Patient name: Rayne Plasencia  : 1970  MRN: [id number]  Referring provider: Ata Stafford MD  Dx:   Encounter Diagnosis     ICD-10-CM    1. Unilateral primary osteoarthritis, right knee  M17.11       2. S/P TKR (total knee replacement), right  Z96.651                      Subjective: Pt reports improvement in R knee stiffness since wearing lidocaine patches given to her by her doctor last week following ortho f/u regarding lateral R knee pain. Pt reports doctor stated pain was due to irritated fat pad. Pt adds, \"it's still swollen but I don't get the restriction that I was getting last week.\"   Next ortho f/u 25.       Objective: See treatment diary below      Assessment: Tolerated treatment well. Patient would benefit from continued PT For improved strength, flexibility and overall function.  Responds better to manual EOT flexion stretching with improved soft tissue mobility of R knee afterwards.  Held clock taps after initial reps to avoid further exacerbation of pt's sx related to fat pad issue.       Plan: Continue per plan of care.      Precautions: PMH R tibia stress fx May 2024, depression, anxiety.        Manuals 2/7 2/10 2/11 2/13 2/17     2   Patellar glides sup/inf ML VIRI SK SK      VIRI   Man str flexion supine 5x 20''  x5 Supine + EOT EOT  EOT VIRI     x5                             Neuro Re-Ed                          Clock taps F,lat 3x10 ea 3x10 ea  3x10 ea 3x10 ea X5, np     3x10 ea                                                                     Ther Ex             EOT Self Knee Flexion    5\"x10 5''  x10                     Wall slide flexion 30x 10''  x30 x30 x30 X30      X30    Wall gastroc str 5x 20''  x5 x5 x5 x5     x5                Straddle quad str w strap 5x 20''  x5  x5 x5     X5                 LAQ 5#  3x10 3x10 3x10 3x10 3x10     3x10    Straddle HS str 20''x5 x5 x5 x5 x5     x5   NuStep for strength 10' 10' 10' 10' 10'      10'  " "  Stand flexion foot on chair 30x 10''  x30 x30 x30 x30     x30   TKE w t band Silver 3x10 3x10 3x10 3x10 3x10     3x10   Leg Press R seat 6 holes 6pl 3x10 3x10 3x10 3x10 3x10     3x10                Ther Activity             T ball squat 3x10 3x10 3x10 3x10 3x10     3x10   LSU 8\"  3x10 3x10 3x10 3x10 3x10     3x10   FSU 8''  3x10 3x10 3x10 3x10 3x10     3x10   Gait Training                                       Modalities             Ice                                                                              "

## 2025-02-19 ENCOUNTER — OFFICE VISIT (OUTPATIENT)
Dept: PHYSICAL THERAPY | Facility: CLINIC | Age: 55
End: 2025-02-19
Payer: COMMERCIAL

## 2025-02-19 DIAGNOSIS — M17.11 UNILATERAL PRIMARY OSTEOARTHRITIS, RIGHT KNEE: Primary | ICD-10-CM

## 2025-02-19 DIAGNOSIS — Z96.651 S/P TKR (TOTAL KNEE REPLACEMENT), RIGHT: ICD-10-CM

## 2025-02-19 PROCEDURE — 97110 THERAPEUTIC EXERCISES: CPT | Performed by: PHYSICAL THERAPIST

## 2025-02-19 PROCEDURE — 97140 MANUAL THERAPY 1/> REGIONS: CPT | Performed by: PHYSICAL THERAPIST

## 2025-02-19 NOTE — PROGRESS NOTES
"Daily Note     Today's date: 2025  Patient name: Rayne Plasencia  : 1970  MRN: [id number]  Referring provider: Ata Stafford MD  Dx:   Encounter Diagnosis     ICD-10-CM    1. Unilateral primary osteoarthritis, right knee  M17.11       2. S/P TKR (total knee replacement), right  Z96.651                      Subjective: Patient reports she feels like she is back to being stuck with knee flexion. She follows up with the surgeon on Thursday next week.      Objective: See treatment diary below  Able to achieve 115* knee flexion EOT today.    Assessment:  Tolerated treatment well. Continued with program as noted below. She does well with EOT stretching today with breathing cues. Some pain noted with step ups at 8\" step causing her to push off more with her RLE. Decreased height to 6\" with improved symptoms and performance. Patient exhibited good technique throughout the session, demonstrates fatigue post treatment, and would benefit from continued PT.        Plan: Continue per plan of care.      Precautions: PMH R tibia stress fx May 2024, depression, anxiety.        Manuals 2/7 2/10 2/11 2/13 2/17 2/19       Patellar glides sup/inf ML VIRI SK SK         Man str flexion supine 5x 20''  x5 Supine + EOT EOT  EOT VIRI EOT SK                                 Neuro Re-Ed                          Clock taps F,lat 3x10 ea 3x10 ea  3x10 ea 3x10 ea X5, np -                                                                        Ther Ex             EOT Self Knee Flexion    5\"x10 5''  x10 x15                    Wall slide flexion 30x 10''  x30 x30 x30 X30  x30       Wall gastroc str 5x 20''  x5 x5 x5 x5 x5                    Straddle quad str w strap 5x 20''  x5  x5 x5                     LAQ 5#  3x10 3x10 3x10 3x10 3x10        Straddle HS str 20''x5 x5 x5 x5 x5        NuStep for strength 10' 10' 10' 10' 10'  10' L7       Stand flexion foot on chair 30x 10''  x30 x30 x30 x30 x30       TKE w t band Silver 3x10 3x10 3x10 3x10 " "3x10 3x10       Leg Press R seat 6 holes 6pl 3x10 3x10 3x10 3x10 3x10 3x10                    Ther Activity             T ball squat 3x10 3x10 3x10 3x10 3x10 3x10       LSU 8\"  3x10 3x10 3x10 3x10 3x10 6\"x30       FSU 8''  3x10 3x10 3x10 3x10 3x10 6\"x30       Gait Training                                       Modalities             Ice                                                                                "

## 2025-02-21 ENCOUNTER — APPOINTMENT (OUTPATIENT)
Dept: PHYSICAL THERAPY | Facility: CLINIC | Age: 55
End: 2025-02-21
Payer: COMMERCIAL

## 2025-02-21 ENCOUNTER — OFFICE VISIT (OUTPATIENT)
Dept: PHYSICAL THERAPY | Facility: CLINIC | Age: 55
End: 2025-02-21
Payer: COMMERCIAL

## 2025-02-21 DIAGNOSIS — Z96.651 S/P TKR (TOTAL KNEE REPLACEMENT), RIGHT: ICD-10-CM

## 2025-02-21 DIAGNOSIS — M17.11 UNILATERAL PRIMARY OSTEOARTHRITIS, RIGHT KNEE: Primary | ICD-10-CM

## 2025-02-21 PROCEDURE — 97110 THERAPEUTIC EXERCISES: CPT | Performed by: PHYSICAL THERAPIST

## 2025-02-21 PROCEDURE — 97140 MANUAL THERAPY 1/> REGIONS: CPT | Performed by: PHYSICAL THERAPIST

## 2025-02-21 PROCEDURE — 97530 THERAPEUTIC ACTIVITIES: CPT | Performed by: PHYSICAL THERAPIST

## 2025-02-21 NOTE — PROGRESS NOTES
"Daily Note     Today's date: 2025  Patient name: Rayne Plasencia  : 1970  MRN: [id number]  Referring provider: Ata Stafford MD  Dx:   Encounter Diagnosis     ICD-10-CM    1. Unilateral primary osteoarthritis, right knee  M17.11       2. S/P TKR (total knee replacement), right  Z96.651                      Subjective: patient reports she has no pain pre treatment. She also slept really well last night which she is very happy about.      Objective: See treatment diary below  EOT Knee Flexion 120 degrees    Assessment:  Tolerated treatment well. Continued with program as noted below. She was able to achieve 120 degrees knee flexion with edge of table stretching today. She is progressing well  Patient exhibited good technique throughout the session, demonstrates fatigue post treatment, and would benefit from continued PT.        Plan: Continue per plan of care.      Precautions: PMH R tibia stress fx May 2024, depression, anxiety.        Manuals 2/7 2/10 2/11 2/13 2/17 2/19 2/21      Patellar glides sup/inf ML VIRI SK SK         Man str flexion supine 5x 20''  x5 Supine + EOT EOT  EOT VIRI EOT SK EOT SK                                Neuro Re-Ed                          Clock taps F,lat 3x10 ea 3x10 ea  3x10 ea 3x10 ea X5, np - -                                                                       Ther Ex             EOT Self Knee Flexion    5\"x10 5''  x10 x15 x15                   Wall slide flexion 30x 10''  x30 x30 x30 X30  x30 x30      Wall gastroc str 5x 20''  x5 x5 x5 x5 x5 x5                   Straddle quad str w strap 5x 20''  x5  x5 x5                     LAQ 5#  3x10 3x10 3x10 3x10 3x10  6# 3x10      Straddle HS str 20''x5 x5 x5 x5 x5        NuStep for strength 10' 10' 10' 10' 10'  10' L7 10'      Stand flexion foot on chair 30x 10''  x30 x30 x30 x30 x30 nv      TKE w t band Silver 3x10 3x10 3x10 3x10 3x10 3x10 3x10      Leg Press R seat 6 holes 6pl 3x10 3x10 3x10 3x10 3x10 3x10 3x10                 " "  Ther Activity             T ball squat 3x10 3x10 3x10 3x10 3x10 3x10 3x10      LSU 8\"  3x10 3x10 3x10 3x10 3x10 6\"x30 6\"x30      FSU 8''  3x10 3x10 3x10 3x10 3x10 6\"x30 8\" x30      Gait Training                                       Modalities             Ice                                "

## 2025-02-24 ENCOUNTER — OFFICE VISIT (OUTPATIENT)
Dept: PHYSICAL THERAPY | Facility: CLINIC | Age: 55
End: 2025-02-24
Payer: COMMERCIAL

## 2025-02-24 DIAGNOSIS — Z96.651 S/P TKR (TOTAL KNEE REPLACEMENT), RIGHT: ICD-10-CM

## 2025-02-24 DIAGNOSIS — M17.11 UNILATERAL PRIMARY OSTEOARTHRITIS, RIGHT KNEE: Primary | ICD-10-CM

## 2025-02-24 PROCEDURE — 97530 THERAPEUTIC ACTIVITIES: CPT

## 2025-02-24 PROCEDURE — 97140 MANUAL THERAPY 1/> REGIONS: CPT

## 2025-02-24 PROCEDURE — 97110 THERAPEUTIC EXERCISES: CPT

## 2025-02-24 NOTE — PROGRESS NOTES
"Daily Note     Today's date: 2025  Patient name: Rayne Plasencia  : 1970  MRN: [id number]  Referring provider: Ata Stafford MD  Dx:   Encounter Diagnosis     ICD-10-CM    1. Unilateral primary osteoarthritis, right knee  M17.11       2. S/P TKR (total knee replacement), right  Z96.651                      Subjective: \"Just achey.\" Pt is happy that she reached 120 degrees of R knee flexion last week, \"but I want to reach it on my own eventually.\"   Ortho f/u with Dr. Stafford on 24      Objective: See treatment diary below      Assessment: Tolerated treatment well. Patient would benefit from continued PT For improved strength, flexibility and overall function.  Reminded pt that her R knee ROM is now WFL, to focus on strengthening and that she is progressing well towards therapeutic goals.  Corrected form with [employer] for improved quad eccentric loading with less R tmamy-patellar pain.    Plan: Continue per plan of care.      Precautions: PMH R tibia stress fx May 2024, depression, anxiety.        Manuals 2/7 2/10 2/11 2/13 2/17 2/19 2/21 2/24     Patellar glides sup/inf ML VIRI SK SK         Man str flexion supine 5x 20''  x5 Supine + EOT EOT  EOT VIRI EOT SK EOT SK EOT VIRI                               Neuro Re-Ed                          Clock taps F,lat 3x10 ea 3x10 ea  3x10 ea 3x10 ea X5, np - -                                                                       Ther Ex             EOT Self Knee Flexion    5\"x10 5''  x10 x15 x15 x15                  Wall slide flexion 30x 10''  x30 x30 x30 X30  x30 x30 x30     Wall gastroc str 5x 20''  x5 x5 x5 x5 x5 x5 X5                   Straddle quad str w strap 5x 20''  x5  x5 x5                     LAQ 5#  3x10 3x10 3x10 3x10 3x10  6# 3x10 3x10     Straddle HS str 20''x5 x5 x5 x5 x5        NuStep for strength 10' 10' 10' 10' 10'  10' L7 10' 10'     Stand flexion foot on chair 30x 10''  x30 x30 x30 x30 x30 nv x30     TKE w t band Silver 3x10 3x10 3x10 3x10 3x10 " "3x10 3x10 3x10     Leg Press R seat 6 holes 6pl 3x10 3x10 3x10 3x10 3x10 3x10 3x10 3x10                  Ther Activity             T ball squat 3x10 3x10 3x10 3x10 3x10 3x10 3x10 3x10     LSU 8\"  3x10 3x10 3x10 3x10 3x10 6\"x30 6\"x30 6'' x30     FSU 8''  3x10 3x10 3x10 3x10 3x10 6\"x30 8\" x30 8'' x30     Gait Training                                       Modalities             Ice                                  "

## 2025-02-26 ENCOUNTER — OFFICE VISIT (OUTPATIENT)
Dept: PHYSICAL THERAPY | Facility: CLINIC | Age: 55
End: 2025-02-26
Payer: COMMERCIAL

## 2025-02-26 DIAGNOSIS — Z96.651 S/P TKR (TOTAL KNEE REPLACEMENT), RIGHT: ICD-10-CM

## 2025-02-26 DIAGNOSIS — M17.11 UNILATERAL PRIMARY OSTEOARTHRITIS, RIGHT KNEE: Primary | ICD-10-CM

## 2025-02-26 PROCEDURE — 97530 THERAPEUTIC ACTIVITIES: CPT

## 2025-02-26 PROCEDURE — 97110 THERAPEUTIC EXERCISES: CPT

## 2025-02-26 PROCEDURE — 97140 MANUAL THERAPY 1/> REGIONS: CPT

## 2025-02-26 NOTE — PROGRESS NOTES
"Daily Note     Today's date: 2025  Patient name: Rayne Plasencia  : 1970  MRN: [id number]  Referring provider: Ata Stafford MD  Dx:   Encounter Diagnosis     ICD-10-CM    1. Unilateral primary osteoarthritis, right knee  M17.11       2. S/P TKR (total knee replacement), right  Z96.651                      Subjective: \"Good, the knee feels good today.\" Pt denies R knee pain 0/10. Ortho f/u scheduled for tomorrow, 25.       Objective: See treatment diary below      Assessment: Tolerated treatment well. Patient would benefit from continued PT For improved strength, flexibility and overall function.  Maintaining R knee ROM gains.     Plan: Continue per plan of care.  Two more weeks of PT with RE to possible D/C to fitness program pending ortho f/u tomorrow.      Precautions: PMH R tibia stress fx May 2024, depression, anxiety.        Manuals 2/7 2/10 2/11 2/13 2/17 2/19 2/21 2/24 2/26    Patellar glides sup/inf ML VIRI SK SK         Man str flexion supine 5x 20''  x5 Supine + EOT EOT  EOT VIRI EOT SK EOT SK EOT VIRI EOT VIRI                              Neuro Re-Ed                          Clock taps F,lat 3x10 ea 3x10 ea  3x10 ea 3x10 ea X5, np - -                                                                       Ther Ex             EOT Self Knee Flexion    5\"x10 5''  x10 x15 x15 x15 x15                 Wall slide flexion 30x 10''  x30 x30 x30 X30  x30 x30 x30 x30    Wall gastroc str 5x 20''  x5 x5 x5 x5 x5 x5 X5  x5                 Straddle quad str w strap 5x 20''  x5  x5 x5                     LAQ 5#  3x10 3x10 3x10 3x10 3x10  6# 3x10 3x10 3x10    Straddle HS str 20''x5 x5 x5 x5 x5        NuStep for strength 10' 10' 10' 10' 10'  10' L7 10' 10' 10'    Stand flexion foot on chair 30x 10''  x30 x30 x30 x30 x30 nv x30 x30    TKE w t band Silver 3x10 3x10 3x10 3x10 3x10 3x10 3x10 3x10 3x10    Leg Press R seat 6 holes 6pl 3x10 3x10 3x10 3x10 3x10 3x10 3x10 3x10 3x10                 Ther Activity             T " "ball squat 3x10 3x10 3x10 3x10 3x10 3x10 3x10 3x10 3x10    LSU 8\"  3x10 3x10 3x10 3x10 3x10 6\"x30 6\"x30 6'' x30 6'' x30    FSU 8''  3x10 3x10 3x10 3x10 3x10 6\"x30 8\" x30 8'' x30 8''x30    Gait Training                                       Modalities             Ice                                    "

## 2025-02-28 ENCOUNTER — APPOINTMENT (OUTPATIENT)
Dept: PHYSICAL THERAPY | Facility: CLINIC | Age: 55
End: 2025-02-28
Payer: COMMERCIAL

## 2025-03-03 ENCOUNTER — OFFICE VISIT (OUTPATIENT)
Dept: PHYSICAL THERAPY | Facility: CLINIC | Age: 55
End: 2025-03-03
Payer: COMMERCIAL

## 2025-03-03 DIAGNOSIS — M17.11 UNILATERAL PRIMARY OSTEOARTHRITIS, RIGHT KNEE: Primary | ICD-10-CM

## 2025-03-03 DIAGNOSIS — Z96.651 S/P TKR (TOTAL KNEE REPLACEMENT), RIGHT: ICD-10-CM

## 2025-03-03 PROCEDURE — 97530 THERAPEUTIC ACTIVITIES: CPT | Performed by: PHYSICAL THERAPIST

## 2025-03-03 PROCEDURE — 97140 MANUAL THERAPY 1/> REGIONS: CPT | Performed by: PHYSICAL THERAPIST

## 2025-03-03 PROCEDURE — 97110 THERAPEUTIC EXERCISES: CPT | Performed by: PHYSICAL THERAPIST

## 2025-03-03 NOTE — PROGRESS NOTES
"Daily Note     Today's date: 3/3/2025  Patient name: Rayne Plasencia  : 1970  MRN: [id number]  Referring provider: Ata Stafford MD  Dx:   Encounter Diagnosis     ICD-10-CM    1. Unilateral primary osteoarthritis, right knee  M17.11       2. S/P TKR (total knee replacement), right  Z96.651                      Subjective: patient reports overall feeling good just stiff. She saw ortho and it went well he was happy with the progress thus far.      Objective: See treatment diary below      Assessment: Tolerated treatment well. Continued with program as noted below. Patient demonstrated fatigue post treatment, exhibited good technique with therapeutic exercises, and would benefit from continued PT      Plan: Continue per plan of care.      Precautions: PMH R tibia stress fx May 2024, depression, anxiety.        Manuals 2/7 2/10 2/11 2/13 2/17 2/19 2/21 2/24 2/26 3/3   Patellar glides sup/inf ML VIRI SK SK         Man str flexion supine 5x 20''  x5 Supine + EOT EOT  EOT VIRI EOT SK EOT SK EOT VIRI EOT VIRI EOT SK                             Neuro Re-Ed                          Clock taps F,lat 3x10 ea 3x10 ea  3x10 ea 3x10 ea X5, np - -   x30                                                                    Ther Ex                          EOT Self Knee Flexion    5\"x10 5''  x10 x15 x15 x15 x15 x15                Wall slide flexion 30x 10''  x30 x30 x30 X30  x30 x30 x30 x30 x30   Wall gastroc str 5x 20''  x5 x5 x5 x5 x5 x5 X5  x5 x5                Straddle quad str w strap 5x 20''  x5  x5 x5                     LAQ 5#  3x10 3x10 3x10 3x10 3x10  6# 3x10 3x10 3x10 3x10   Straddle HS str 20''x5 x5 x5 x5 x5        NuStep for strength 10' 10' 10' 10' 10'  10' L7 10' 10' 10' 10'   Stand flexion foot on chair 30x 10''  x30 x30 x30 x30 x30 nv x30 x30 x30   TKE w t band Silver 3x10 3x10 3x10 3x10 3x10 3x10 3x10 3x10 3x10 3x10   Leg Press R seat 6 holes 6pl 3x10 3x10 3x10 3x10 3x10 3x10 3x10 3x10 3x10 3x10                Ther " "Activity             T ball squat 3x10 3x10 3x10 3x10 3x10 3x10 3x10 3x10 3x10 3x10   LSU 8\"  3x10 3x10 3x10 3x10 3x10 6\"x30 6\"x30 6'' x30 6'' x30 6\"x30   FSU 8''  3x10 3x10 3x10 3x10 3x10 6\"x30 8\" x30 8'' x30 8''x30 8\"x30   Gait Training                                       Modalities             Ice                                      "

## 2025-03-05 ENCOUNTER — OFFICE VISIT (OUTPATIENT)
Dept: PHYSICAL THERAPY | Facility: CLINIC | Age: 55
End: 2025-03-05
Payer: COMMERCIAL

## 2025-03-05 DIAGNOSIS — Z96.651 S/P TKR (TOTAL KNEE REPLACEMENT), RIGHT: ICD-10-CM

## 2025-03-05 DIAGNOSIS — M17.11 UNILATERAL PRIMARY OSTEOARTHRITIS, RIGHT KNEE: Primary | ICD-10-CM

## 2025-03-05 PROCEDURE — 97110 THERAPEUTIC EXERCISES: CPT | Performed by: PHYSICAL THERAPIST

## 2025-03-05 PROCEDURE — 97530 THERAPEUTIC ACTIVITIES: CPT | Performed by: PHYSICAL THERAPIST

## 2025-03-05 PROCEDURE — 97140 MANUAL THERAPY 1/> REGIONS: CPT | Performed by: PHYSICAL THERAPIST

## 2025-03-05 NOTE — PROGRESS NOTES
"Daily Note     Today's date: 3/5/2025  Patient name: Rayne Plasencia  : 1970  MRN: [id number]  Referring provider: Ata Stafford MD  Dx:   Encounter Diagnosis     ICD-10-CM    1. Unilateral primary osteoarthritis, right knee  M17.11       2. S/P TKR (total knee replacement), right  Z96.651                      Subjective: Patient reports no issues after her last session. She mentions that she is comfortable with transitioning into the fitness program after today.      Objective: See treatment diary below      Assessment: Tolerated treatment well. Continued with program as noted below. Patient exhibited good technique throughout the session. She demonstrates good understanding of how to progress program independently. She is also a good candidate for the fitness program. All patient questions were answered. She is appropriate for discharge at this time.      Plan: patient is d/c to home program and fitness program     Precautions: PMH R tibia stress fx May 2024, depression, anxiety.        Manuals 2/10 2/11 2/13 2/17 2/19 2/21 2/24 2/26 3/3 3/5   Patellar glides sup/inf VIRI SK SK          Man str flexion supine 20''  x5 Supine + EOT EOT  EOT VIRI EOT SK EOT SK EOT VIRI EOT VIRI EOT SK EOT SK                             Neuro Re-Ed                          Clock taps F,lat 3x10 ea  3x10 ea 3x10 ea X5, np - -   x30                                                                     Ther Ex                          EOT Self Knee Flexion   5\"x10 5''  x10 x15 x15 x15 x15 x15 x15                Wall slide flexion 10''  x30 x30 x30 X30  x30 x30 x30 x30 x30 x30   Wall gastroc str 20''  x5 x5 x5 x5 x5 x5 X5  x5 x5 x5                Straddle quad str w strap 20''  x5  x5 x5                      LAQ 3x10 3x10 3x10 3x10  6# 3x10 3x10 3x10 3x10 3x10   Straddle HS str x5 x5 x5 x5         NuStep for strength 10' 10' 10' 10'  10' L7 10' 10' 10' 10' 10'   Stand flexion foot on chair 10''  x30 x30 x30 x30 x30 nv x30 x30 x30 x30   TKE w " "t band 3x10 3x10 3x10 3x10 3x10 3x10 3x10 3x10 3x10    Leg Press R seat 6 holes 3x10 3x10 3x10 3x10 3x10 3x10 3x10 3x10 3x10 6pl x10 7pl x10                Ther Activity             T ball squat 3x10 3x10 3x10 3x10 3x10 3x10 3x10 3x10 3x10 3x10   LSU 3x10 3x10 3x10 3x10 6\"x30 6\"x30 6'' x30 6'' x30 6\"x30 x5   FSU 3x10 3x10 3x10 3x10 6\"x30 8\" x30 8'' x30 8''x30 8\"x30 x30   Gait Training                                       Modalities             Ice                                        "